# Patient Record
Sex: FEMALE | Race: WHITE | NOT HISPANIC OR LATINO | URBAN - METROPOLITAN AREA
[De-identification: names, ages, dates, MRNs, and addresses within clinical notes are randomized per-mention and may not be internally consistent; named-entity substitution may affect disease eponyms.]

---

## 2017-01-27 ENCOUNTER — OFFICE VISIT - RIVER FALLS (OUTPATIENT)
Dept: FAMILY MEDICINE | Facility: CLINIC | Age: 22
End: 2017-01-27

## 2017-02-07 ENCOUNTER — OFFICE VISIT - RIVER FALLS (OUTPATIENT)
Dept: FAMILY MEDICINE | Facility: CLINIC | Age: 22
End: 2017-02-07

## 2017-02-07 ASSESSMENT — MIFFLIN-ST. JEOR: SCORE: 1988.83

## 2017-03-27 ENCOUNTER — OFFICE VISIT - RIVER FALLS (OUTPATIENT)
Dept: FAMILY MEDICINE | Facility: CLINIC | Age: 22
End: 2017-03-27

## 2017-04-27 ENCOUNTER — OFFICE VISIT - RIVER FALLS (OUTPATIENT)
Dept: FAMILY MEDICINE | Facility: CLINIC | Age: 22
End: 2017-04-27

## 2017-07-13 ENCOUNTER — OFFICE VISIT - RIVER FALLS (OUTPATIENT)
Dept: FAMILY MEDICINE | Facility: CLINIC | Age: 22
End: 2017-07-13

## 2017-08-09 ENCOUNTER — OFFICE VISIT - RIVER FALLS (OUTPATIENT)
Dept: FAMILY MEDICINE | Facility: CLINIC | Age: 22
End: 2017-08-09

## 2017-08-09 ASSESSMENT — MIFFLIN-ST. JEOR: SCORE: 2025.12

## 2018-02-15 ENCOUNTER — OFFICE VISIT - RIVER FALLS (OUTPATIENT)
Dept: FAMILY MEDICINE | Facility: CLINIC | Age: 23
End: 2018-02-15

## 2018-02-15 ASSESSMENT — MIFFLIN-ST. JEOR: SCORE: 2058.69

## 2018-02-23 ENCOUNTER — OFFICE VISIT - RIVER FALLS (OUTPATIENT)
Dept: FAMILY MEDICINE | Facility: CLINIC | Age: 23
End: 2018-02-23

## 2018-03-01 ENCOUNTER — OFFICE VISIT - RIVER FALLS (OUTPATIENT)
Dept: FAMILY MEDICINE | Facility: CLINIC | Age: 23
End: 2018-03-01

## 2018-05-14 ENCOUNTER — OFFICE VISIT - RIVER FALLS (OUTPATIENT)
Dept: FAMILY MEDICINE | Facility: CLINIC | Age: 23
End: 2018-05-14

## 2018-05-14 ENCOUNTER — COMMUNICATION - RIVER FALLS (OUTPATIENT)
Dept: FAMILY MEDICINE | Facility: CLINIC | Age: 23
End: 2018-05-14

## 2018-05-16 ENCOUNTER — OFFICE VISIT - RIVER FALLS (OUTPATIENT)
Dept: FAMILY MEDICINE | Facility: CLINIC | Age: 23
End: 2018-05-16

## 2018-05-16 ASSESSMENT — MIFFLIN-ST. JEOR: SCORE: 2075.47

## 2018-06-14 ENCOUNTER — OFFICE VISIT - RIVER FALLS (OUTPATIENT)
Dept: FAMILY MEDICINE | Facility: CLINIC | Age: 23
End: 2018-06-14

## 2018-07-13 ENCOUNTER — OFFICE VISIT - RIVER FALLS (OUTPATIENT)
Dept: FAMILY MEDICINE | Facility: CLINIC | Age: 23
End: 2018-07-13

## 2018-07-13 ASSESSMENT — MIFFLIN-ST. JEOR: SCORE: 2105.86

## 2018-11-09 ENCOUNTER — OFFICE VISIT - RIVER FALLS (OUTPATIENT)
Dept: FAMILY MEDICINE | Facility: CLINIC | Age: 23
End: 2018-11-09

## 2018-11-09 ENCOUNTER — COMMUNICATION - RIVER FALLS (OUTPATIENT)
Dept: FAMILY MEDICINE | Facility: CLINIC | Age: 23
End: 2018-11-09

## 2019-01-10 ENCOUNTER — OFFICE VISIT - RIVER FALLS (OUTPATIENT)
Dept: FAMILY MEDICINE | Facility: CLINIC | Age: 24
End: 2019-01-10

## 2019-02-22 ENCOUNTER — OFFICE VISIT - RIVER FALLS (OUTPATIENT)
Dept: FAMILY MEDICINE | Facility: CLINIC | Age: 24
End: 2019-02-22

## 2019-02-22 LAB — HCG UR QL: NEGATIVE

## 2019-02-23 LAB
CHLAMYDIA TRACHOMATIS RNA, TMA - QUEST: NOT DETECTED
NEISSERIA GONORRHOEAE RNA TMA: NOT DETECTED

## 2019-04-04 ENCOUNTER — OFFICE VISIT - RIVER FALLS (OUTPATIENT)
Dept: FAMILY MEDICINE | Facility: CLINIC | Age: 24
End: 2019-04-04

## 2019-04-04 ASSESSMENT — MIFFLIN-ST. JEOR: SCORE: 2097.7

## 2019-04-12 ENCOUNTER — OFFICE VISIT - RIVER FALLS (OUTPATIENT)
Dept: FAMILY MEDICINE | Facility: CLINIC | Age: 24
End: 2019-04-12

## 2019-04-12 ASSESSMENT — MIFFLIN-ST. JEOR: SCORE: 2120.38

## 2020-09-23 ENCOUNTER — OFFICE VISIT - RIVER FALLS (OUTPATIENT)
Dept: FAMILY MEDICINE | Facility: CLINIC | Age: 25
End: 2020-09-23

## 2020-09-23 ENCOUNTER — AMBULATORY - RIVER FALLS (OUTPATIENT)
Dept: FAMILY MEDICINE | Facility: CLINIC | Age: 25
End: 2020-09-23

## 2020-10-09 ENCOUNTER — OFFICE VISIT - RIVER FALLS (OUTPATIENT)
Dept: FAMILY MEDICINE | Facility: CLINIC | Age: 25
End: 2020-10-09

## 2020-10-09 ASSESSMENT — MIFFLIN-ST. JEOR: SCORE: 2156.66

## 2020-10-19 ENCOUNTER — OFFICE VISIT - RIVER FALLS (OUTPATIENT)
Dept: FAMILY MEDICINE | Facility: CLINIC | Age: 25
End: 2020-10-19

## 2020-10-19 ASSESSMENT — MIFFLIN-ST. JEOR: SCORE: 2170.72

## 2020-12-17 ENCOUNTER — OFFICE VISIT - RIVER FALLS (OUTPATIENT)
Dept: FAMILY MEDICINE | Facility: CLINIC | Age: 25
End: 2020-12-17

## 2020-12-17 ASSESSMENT — MIFFLIN-ST. JEOR: SCORE: 2173.9

## 2021-02-01 ENCOUNTER — OFFICE VISIT - RIVER FALLS (OUTPATIENT)
Dept: FAMILY MEDICINE | Facility: CLINIC | Age: 26
End: 2021-02-01

## 2021-02-01 ENCOUNTER — AMBULATORY - RIVER FALLS (OUTPATIENT)
Dept: FAMILY MEDICINE | Facility: CLINIC | Age: 26
End: 2021-02-01

## 2021-02-01 ENCOUNTER — COMMUNICATION - RIVER FALLS (OUTPATIENT)
Dept: FAMILY MEDICINE | Facility: CLINIC | Age: 26
End: 2021-02-01

## 2021-02-01 LAB — DEPRECATED S PYO AG THROAT QL EIA: NOT DETECTED

## 2021-02-03 LAB — SARS-COV-2 RNA RESP QL NAA+PROBE: NEGATIVE

## 2021-03-02 ENCOUNTER — OFFICE VISIT - RIVER FALLS (OUTPATIENT)
Dept: FAMILY MEDICINE | Facility: CLINIC | Age: 26
End: 2021-03-02

## 2021-03-02 ENCOUNTER — AMBULATORY - RIVER FALLS (OUTPATIENT)
Dept: FAMILY MEDICINE | Facility: CLINIC | Age: 26
End: 2021-03-02

## 2021-03-02 LAB
DEPRECATED S PYO AG THROAT QL EIA: NOT DETECTED
FLUAV AG SPEC QL IA: NEGATIVE
FLUBV AG SPEC QL IA: NEGATIVE

## 2021-03-03 ENCOUNTER — COMMUNICATION - RIVER FALLS (OUTPATIENT)
Dept: FAMILY MEDICINE | Facility: CLINIC | Age: 26
End: 2021-03-03

## 2021-03-04 ENCOUNTER — COMMUNICATION - RIVER FALLS (OUTPATIENT)
Dept: FAMILY MEDICINE | Facility: CLINIC | Age: 26
End: 2021-03-04

## 2021-03-04 ENCOUNTER — OFFICE VISIT - RIVER FALLS (OUTPATIENT)
Dept: FAMILY MEDICINE | Facility: CLINIC | Age: 26
End: 2021-03-04

## 2021-03-04 LAB — SARS-COV-2 RNA RESP QL NAA+PROBE: NEGATIVE

## 2021-03-04 ASSESSMENT — MIFFLIN-ST. JEOR: SCORE: 2188.42

## 2021-08-03 ENCOUNTER — COMMUNICATION - RIVER FALLS (OUTPATIENT)
Dept: FAMILY MEDICINE | Facility: CLINIC | Age: 26
End: 2021-08-03

## 2021-09-15 ENCOUNTER — OFFICE VISIT - RIVER FALLS (OUTPATIENT)
Dept: FAMILY MEDICINE | Facility: CLINIC | Age: 26
End: 2021-09-15

## 2021-09-21 ENCOUNTER — COMMUNICATION - RIVER FALLS (OUTPATIENT)
Dept: FAMILY MEDICINE | Facility: CLINIC | Age: 26
End: 2021-09-21

## 2021-09-22 ENCOUNTER — OFFICE VISIT - RIVER FALLS (OUTPATIENT)
Dept: FAMILY MEDICINE | Facility: CLINIC | Age: 26
End: 2021-09-22

## 2021-09-22 ENCOUNTER — COMMUNICATION - RIVER FALLS (OUTPATIENT)
Dept: FAMILY MEDICINE | Facility: CLINIC | Age: 26
End: 2021-09-22

## 2021-09-22 ASSESSMENT — MIFFLIN-ST. JEOR: SCORE: 2192.5

## 2021-09-25 LAB
CHLAMYDIA TRACHOMATIS RNA, TMA - QUEST: NOT DETECTED
HSV CULTURE - QUEST: NORMAL
MICROBIOLOGY SPEC SOURCE (HISTORICAL): NORMAL
NEISSERIA GONORRHOEAE RNA TMA: NOT DETECTED

## 2021-09-28 ENCOUNTER — COMMUNICATION - RIVER FALLS (OUTPATIENT)
Dept: FAMILY MEDICINE | Facility: CLINIC | Age: 26
End: 2021-09-28

## 2021-11-03 ENCOUNTER — COMMUNICATION - RIVER FALLS (OUTPATIENT)
Dept: FAMILY MEDICINE | Facility: CLINIC | Age: 26
End: 2021-11-03

## 2021-11-24 ENCOUNTER — OFFICE VISIT - RIVER FALLS (OUTPATIENT)
Dept: FAMILY MEDICINE | Facility: CLINIC | Age: 26
End: 2021-11-24

## 2021-11-24 ASSESSMENT — MIFFLIN-ST. JEOR: SCORE: 2191.59

## 2021-12-31 ENCOUNTER — OFFICE VISIT - RIVER FALLS (OUTPATIENT)
Dept: FAMILY MEDICINE | Facility: CLINIC | Age: 26
End: 2021-12-31

## 2021-12-31 ENCOUNTER — AMBULATORY - RIVER FALLS (OUTPATIENT)
Dept: FAMILY MEDICINE | Facility: CLINIC | Age: 26
End: 2021-12-31

## 2021-12-31 ENCOUNTER — LAB REQUISITION (OUTPATIENT)
Dept: LAB | Facility: CLINIC | Age: 26
End: 2021-12-31
Payer: COMMERCIAL

## 2021-12-31 DIAGNOSIS — U07.1 COVID-19: ICD-10-CM

## 2021-12-31 PROCEDURE — U0003 INFECTIOUS AGENT DETECTION BY NUCLEIC ACID (DNA OR RNA); SEVERE ACUTE RESPIRATORY SYNDROME CORONAVIRUS 2 (SARS-COV-2) (CORONAVIRUS DISEASE [COVID-19]), AMPLIFIED PROBE TECHNIQUE, MAKING USE OF HIGH THROUGHPUT TECHNOLOGIES AS DESCRIBED BY CMS-2020-01-R: HCPCS | Mod: ORL | Performed by: FAMILY MEDICINE

## 2022-01-01 LAB — SARS-COV-2 RNA RESP QL NAA+PROBE: NEGATIVE

## 2022-02-06 ENCOUNTER — HEALTH MAINTENANCE LETTER (OUTPATIENT)
Age: 27
End: 2022-02-06

## 2022-02-11 VITALS
WEIGHT: 265 LBS | HEART RATE: 76 BPM | HEART RATE: 72 BPM | DIASTOLIC BLOOD PRESSURE: 92 MMHG | SYSTOLIC BLOOD PRESSURE: 130 MMHG | SYSTOLIC BLOOD PRESSURE: 134 MMHG | DIASTOLIC BLOOD PRESSURE: 94 MMHG | WEIGHT: 266 LBS

## 2022-02-11 VITALS
BODY MASS INDEX: 37.36 KG/M2 | BODY MASS INDEX: 41.02 KG/M2 | HEIGHT: 71 IN | DIASTOLIC BLOOD PRESSURE: 86 MMHG | WEIGHT: 293 LBS | OXYGEN SATURATION: 97 % | SYSTOLIC BLOOD PRESSURE: 126 MMHG | HEART RATE: 98 BPM | TEMPERATURE: 98.4 F | HEIGHT: 71 IN

## 2022-02-12 VITALS
DIASTOLIC BLOOD PRESSURE: 85 MMHG | TEMPERATURE: 98.8 F | HEART RATE: 85 BPM | SYSTOLIC BLOOD PRESSURE: 128 MMHG | BODY MASS INDEX: 39.16 KG/M2 | WEIGHT: 278.8 LBS

## 2022-02-12 VITALS
TEMPERATURE: 98.3 F | OXYGEN SATURATION: 96 % | SYSTOLIC BLOOD PRESSURE: 126 MMHG | HEIGHT: 71 IN | BODY MASS INDEX: 41.02 KG/M2 | DIASTOLIC BLOOD PRESSURE: 90 MMHG | HEART RATE: 91 BPM | WEIGHT: 293 LBS

## 2022-02-12 VITALS
WEIGHT: 285.8 LBS | WEIGHT: 284 LBS | TEMPERATURE: 97.6 F | SYSTOLIC BLOOD PRESSURE: 138 MMHG | DIASTOLIC BLOOD PRESSURE: 88 MMHG | BODY MASS INDEX: 40.14 KG/M2 | BODY MASS INDEX: 39.89 KG/M2 | TEMPERATURE: 98.5 F | DIASTOLIC BLOOD PRESSURE: 96 MMHG | OXYGEN SATURATION: 97 % | HEART RATE: 80 BPM | HEART RATE: 104 BPM | SYSTOLIC BLOOD PRESSURE: 148 MMHG

## 2022-02-12 VITALS
BODY MASS INDEX: 39.05 KG/M2 | BODY MASS INDEX: 39.31 KG/M2 | DIASTOLIC BLOOD PRESSURE: 90 MMHG | HEART RATE: 90 BPM | SYSTOLIC BLOOD PRESSURE: 124 MMHG | OXYGEN SATURATION: 98 % | HEART RATE: 80 BPM | TEMPERATURE: 98.5 F | WEIGHT: 278 LBS | WEIGHT: 280.8 LBS | DIASTOLIC BLOOD PRESSURE: 100 MMHG | HEIGHT: 71 IN | SYSTOLIC BLOOD PRESSURE: 132 MMHG | TEMPERATURE: 98.3 F

## 2022-02-12 VITALS
SYSTOLIC BLOOD PRESSURE: 122 MMHG | WEIGHT: 279 LBS | HEART RATE: 72 BPM | DIASTOLIC BLOOD PRESSURE: 80 MMHG | BODY MASS INDEX: 39.06 KG/M2 | RESPIRATION RATE: 16 BRPM | HEART RATE: 98 BPM | TEMPERATURE: 100 F | TEMPERATURE: 98.7 F | OXYGEN SATURATION: 99 % | WEIGHT: 284 LBS | DIASTOLIC BLOOD PRESSURE: 88 MMHG | HEIGHT: 71 IN | BODY MASS INDEX: 39.76 KG/M2 | RESPIRATION RATE: 16 BRPM | SYSTOLIC BLOOD PRESSURE: 110 MMHG | HEIGHT: 71 IN

## 2022-02-12 VITALS
BODY MASS INDEX: 38.91 KG/M2 | SYSTOLIC BLOOD PRESSURE: 128 MMHG | HEART RATE: 84 BPM | BODY MASS INDEX: 38.37 KG/M2 | SYSTOLIC BLOOD PRESSURE: 129 MMHG | TEMPERATURE: 97.8 F | TEMPERATURE: 98.5 F | DIASTOLIC BLOOD PRESSURE: 80 MMHG | HEART RATE: 93 BPM | DIASTOLIC BLOOD PRESSURE: 86 MMHG | HEIGHT: 71 IN | WEIGHT: 277 LBS | WEIGHT: 274.1 LBS

## 2022-02-12 VITALS
HEART RATE: 96 BPM | WEIGHT: 292 LBS | TEMPERATURE: 97.5 F | BODY MASS INDEX: 40.88 KG/M2 | HEART RATE: 80 BPM | WEIGHT: 293 LBS | TEMPERATURE: 97.8 F | DIASTOLIC BLOOD PRESSURE: 78 MMHG | HEIGHT: 71 IN | HEIGHT: 71 IN | DIASTOLIC BLOOD PRESSURE: 90 MMHG | SYSTOLIC BLOOD PRESSURE: 126 MMHG | OXYGEN SATURATION: 98 % | SYSTOLIC BLOOD PRESSURE: 110 MMHG | BODY MASS INDEX: 41.02 KG/M2

## 2022-02-12 VITALS
DIASTOLIC BLOOD PRESSURE: 84 MMHG | SYSTOLIC BLOOD PRESSURE: 116 MMHG | HEART RATE: 84 BPM | DIASTOLIC BLOOD PRESSURE: 76 MMHG | BODY MASS INDEX: 42 KG/M2 | TEMPERATURE: 98.1 F | OXYGEN SATURATION: 97 % | SYSTOLIC BLOOD PRESSURE: 124 MMHG | WEIGHT: 293 LBS | HEIGHT: 71 IN | HEIGHT: 71 IN | BODY MASS INDEX: 41.02 KG/M2 | TEMPERATURE: 98.3 F | HEART RATE: 104 BPM

## 2022-02-12 VITALS
SYSTOLIC BLOOD PRESSURE: 124 MMHG | WEIGHT: 270.4 LBS | DIASTOLIC BLOOD PRESSURE: 88 MMHG | BODY MASS INDEX: 37.85 KG/M2 | SYSTOLIC BLOOD PRESSURE: 130 MMHG | DIASTOLIC BLOOD PRESSURE: 76 MMHG | HEART RATE: 84 BPM | TEMPERATURE: 97.5 F | HEART RATE: 84 BPM | TEMPERATURE: 97.9 F | TEMPERATURE: 97.7 F | SYSTOLIC BLOOD PRESSURE: 124 MMHG | HEIGHT: 71 IN | DIASTOLIC BLOOD PRESSURE: 84 MMHG | OXYGEN SATURATION: 97 % | HEART RATE: 84 BPM

## 2022-02-12 VITALS
HEIGHT: 71 IN | TEMPERATURE: 97.5 F | HEIGHT: 71 IN | WEIGHT: 293 LBS | BODY MASS INDEX: 41.55 KG/M2 | SYSTOLIC BLOOD PRESSURE: 120 MMHG | BODY MASS INDEX: 41.02 KG/M2 | OXYGEN SATURATION: 98 % | HEART RATE: 95 BPM | DIASTOLIC BLOOD PRESSURE: 82 MMHG

## 2022-02-12 VITALS
HEIGHT: 71 IN | WEIGHT: 255 LBS | HEART RATE: 84 BPM | HEART RATE: 90 BPM | TEMPERATURE: 97.1 F | DIASTOLIC BLOOD PRESSURE: 92 MMHG | DIASTOLIC BLOOD PRESSURE: 85 MMHG | BODY MASS INDEX: 36.1 KG/M2 | SYSTOLIC BLOOD PRESSURE: 120 MMHG | BODY MASS INDEX: 35.7 KG/M2 | WEIGHT: 257 LBS | SYSTOLIC BLOOD PRESSURE: 126 MMHG

## 2022-02-12 VITALS
DIASTOLIC BLOOD PRESSURE: 86 MMHG | WEIGHT: 263 LBS | TEMPERATURE: 98.3 F | SYSTOLIC BLOOD PRESSURE: 118 MMHG | BODY MASS INDEX: 36.82 KG/M2 | HEART RATE: 80 BPM | HEIGHT: 71 IN

## 2022-02-16 NOTE — NURSING NOTE
Comprehensive Intake Entered On:  4/12/2019 3:09 PM CDT    Performed On:  4/12/2019 3:04 PM CDT by Kit Ordaz CMA               Summary   Chief Complaint :   Pt here for sinus pressure x 4-5 days.   Menstrual Status :   Prophylaxis   Weight Measured :   284 lb(Converted to: 284 lb 0 oz, 128.82 kg)    Height Measured :   70.75 in(Converted to: 5 ft 11 in, 179.70 cm)    Body Mass Index :   39.89 kg/m2 (HI)    Body Surface Area :   2.53 m2   Systolic Blood Pressure :   122 mmHg   Diastolic Blood Pressure :   88 mmHg (HI)    Mean Arterial Pressure :   99 mmHg   Peripheral Pulse Rate :   72 bpm   BP Site :   Right arm   Pulse Site :   Radial artery   Temperature Tympanic :   98.7 DegF(Converted to: 37.1 DegC)    Respiratory Rate :   16 br/min   Oxygen Saturation :   99 %   Race :      Languages :   English   Ethnicity :   Not  or    Kit Ordaz CMA - 4/12/2019 3:04 PM CDT   Health Status   Allergies Verified? :   Yes   Medication History Verified? :   Yes   Immunizations Current :   No   Medical History Verified? :   Yes   Pre-Visit Planning Status :   Not completed   Tobacco Use? :   Current every day smoker   Tobacco Cessation Review :   Not ready to quit   Kit Ordaz CMA - 4/12/2019 3:04 PM CDT   Meds / Allergies   (As Of: 4/12/2019 3:09:00 PM CDT)   Allergies (Active)   clindamycin  Estimated Onset Date:   Unspecified ; Reactions:   diarrhea ; Created By:   Sophia Nuñez CMA; Reaction Status:   Active ; Category:   Drug ; Substance:   clindamycin ; Type:   Allergy ; Updated By:   Sophia Nuñez CMA; Reviewed Date:   4/12/2019 3:07 PM CDT        Medication List   (As Of: 4/12/2019 3:09:00 PM CDT)   Prescription/Discharge Order    FLUoxetine  :   FLUoxetine ; Status:   Prescribed ; Ordered As Mnemonic:   FLUoxetine 10 mg oral capsule ; Simple Display Line:   3 cap(s), Oral, daily, 90 cap(s), 1 Refill(s) ; Ordering Provider:   Shira Hester; Catalog Code:   FLUoxetine ; Order  Dt/Tm:   2/6/2019 2:01:24 PM          busPIRone  :   busPIRone ; Status:   Prescribed ; Ordered As Mnemonic:   busPIRone 10 mg oral tablet ; Simple Display Line:   10 mg, 1 tab(s), PO, TID, 270 tab(s), 1 Refill(s) ; Ordering Provider:   Shira Hester; Catalog Code:   busPIRone ; Order Dt/Tm:   1/10/2019 8:52:59 AM          albuterol  :   albuterol ; Status:   Prescribed ; Ordered As Mnemonic:   albuterol CFC free 90 mcg/inh inhalation aerosol ; Simple Display Line:   2 puff(s), inh, qid, dispense with Valved Chamber please  use with spacer chamber, PRN: as needed for wheezing, 18 gm, 0 Refill(s) ; Ordering Provider:   Miguelina Bush; Catalog Code:   albuterol ; Order Dt/Tm:   3/22/2016 11:26:52 AM            Home Meds    fexofenadine  :   fexofenadine ; Status:   Documented ; Ordered As Mnemonic:   Allegra ; Simple Display Line:   po, 0 Refill(s) ; Catalog Code:   fexofenadine ; Order Dt/Tm:   5/25/2016 10:39:53 AM          levonorgestrel  :   levonorgestrel ; Status:   Documented ; Ordered As Mnemonic:   Keshia 13.5 mg intrauteral device ; Simple Display Line:   13.5 mg, 1 EA, intrauteral, once, Lot [id number], inserted 2/9/16, 0 Refill(s) ; Catalog Code:   levonorgestrel ; Order Dt/Tm:   2/9/2016 3:45:21 PM          fluticasone nasal  :   fluticasone nasal ; Status:   Documented ; Ordered As Mnemonic:   Flonase 50 mcg/inh nasal spray ; Simple Display Line:   1 spray(s), nasal, prn ; Catalog Code:   fluticasone nasal ; Order Dt/Tm:   10/20/2015 1:18:23 PM            Social History   Social History   (As Of: 4/12/2019 3:09:00 PM CDT)   Alcohol:        Current, 1 TIME, 4 drinks/episode average.  5 drinks/episode maximum.   (Last Updated: 1/10/2019 3:16:46 PM CST by Leatha Means)          Tobacco:        Electronic Cigarettes   (Last Updated: 2/15/2018 4:00:43 PM CST by Helen Frankel CMA)          Employment and Education:        Student   Comments:  11/21/2016 11:14 AM - Kit Ordaz CMA: Senior at  [employer]   (Last Updated: 11/21/2016 11:14:11 AM CST by Kit Ordaz CMA)

## 2022-02-16 NOTE — PROGRESS NOTES
Called pt about her negative rapid strep results and understands to wait for the culture and COVID results

## 2022-02-16 NOTE — NURSING NOTE
Comprehensive Intake Entered On:  3/2/2021 11:47 AM CST    Performed On:  3/2/2021 11:41 AM CST by Jennifer Warren LPN               Summary   Chief Complaint :   flu/cold like symptoms, has received 2 vaccines, c/o fever, sore throat, chills, HA, bodyaches, runny nose, fatigue - verbal consent for telephone visit   Menstrual Status :   Prophylaxis   Height Measured :   70.75 in(Converted to: 5 ft 11 in, 179.70 cm)    Race :      Languages :   English   Ethnicity :   Not  or    Jennifer Warren LPN - 3/2/2021 11:41 AM CST   Health Status   Allergies Verified? :   Yes   Medication History Verified? :   Yes   Immunizations Current :   No   Medical History Verified? :   No   Pre-Visit Planning Status :   Completed   Tobacco Use? :   Former smoker   Jennifer Warren LPN - 3/2/2021 11:41 AM CST   Meds / Allergies   (As Of: 3/2/2021 11:47:35 AM CST)   Allergies (Active)   clindamycin  Estimated Onset Date:   Unspecified ; Reactions:   diarrhea ; Created By:   Sophia Nuñez CMA; Reaction Status:   Active ; Category:   Drug ; Substance:   clindamycin ; Type:   Allergy ; Updated By:   Sophia Nuñez CMA; Reviewed Date:   2/1/2021 8:57 AM CST        Medication List   (As Of: 3/2/2021 11:47:35 AM CST)   Prescription/Discharge Order    aripiprazole  :   aripiprazole ; Status:   Prescribed ; Ordered As Mnemonic:   ARIPiprazole 5 mg oral tablet ; Simple Display Line:   5 mg, 1 tab(s), Oral, daily, 90 EA, 1 Refill(s) ; Ordering Provider:   Miguelina Bush; Catalog Code:   aripiprazole ; Order Dt/Tm:   10/9/2020 8:54:14 AM CDT          FLUoxetine  :   FLUoxetine ; Status:   Prescribed ; Ordered As Mnemonic:   FLUoxetine 10 mg oral capsule ; Simple Display Line:   3 cap(s), Oral, daily, 270 cap(s), 1 Refill(s) ; Ordering Provider:   Miguelina Bush; Catalog Code:   FLUoxetine ; Order Dt/Tm:   10/9/2020 8:54:41 AM CDT          nicotine  :   nicotine ; Status:   Prescribed ; Ordered As Mnemonic:    nicotine 7 mg/24 hr transdermal film, extended release ; Simple Display Line:   1 patch(es), Topical, daily, for 14 day(s), after 14 days of 14 mg nicotine, 14 patch(es), 0 Refill(s) ; Ordering Provider:   Miguelina Bush; Catalog Code:   nicotine ; Order Dt/Tm:   10/9/2020 8:53:20 AM CDT          nicotine  :   nicotine ; Status:   Prescribed ; Ordered As Mnemonic:   nicotine 14 mg/24 hr transdermal film, extended release ; Simple Display Line:   1 patch(es), Topical, daily, for 14 day(s), start and use prior to 7mg dose, 14 patch(es), 0 Refill(s) ; Ordering Provider:   Miguelina Bush; Catalog Code:   nicotine ; Order Dt/Tm:   10/9/2020 8:53:14 AM CDT            Home Meds    levonorgestrel  :   levonorgestrel ; Status:   Documented ; Ordered As Mnemonic:   Kyleena 19.5 mg intrauterine device ; Simple Display Line:   19.5 mg, 1 EA, Intrauteral, once, inserted 2/22/19, 0 Refill(s) ; Catalog Code:   levonorgestrel ; Order Dt/Tm:   10/9/2020 8:35:49 AM CDT          fluticasone nasal  :   fluticasone nasal ; Status:   Documented ; Ordered As Mnemonic:   Flonase 50 mcg/inh nasal spray ; Simple Display Line:   1 spray(s), nasal, prn ; Catalog Code:   fluticasone nasal ; Order Dt/Tm:   10/20/2015 1:18:23 PM CDT          fexofenadine  :   fexofenadine ; Status:   Documented ; Ordered As Mnemonic:   Allegra ; Simple Display Line:   po, 0 Refill(s) ; Catalog Code:   fexofenadine ; Order Dt/Tm:   5/25/2016 10:39:53 AM CDT            ID Risk Screen   Recent Travel History :   No recent travel   Family Member Travel History :   No recent travel   Other Exposure to Infectious Disease :   Unknown   COVID-19 Testing Status :   No positive COVID-19 test   Jennifer Warren LPN - 3/2/2021 11:41 AM CST

## 2022-02-16 NOTE — NURSING NOTE
Comprehensive Intake Entered On:  2/1/2021 8:59 AM CST    Performed On:  2/1/2021 8:53 AM CST by Kit Ordaz CMA               Summary   Chief Complaint :   Verbal consent given for a video visit. Pt is c/o fever,sore throat,chills,headachem,body aches and fatigue that started yesterday.   Menstrual Status :   Prophylaxis   Height Measured :   70.75 in(Converted to: 5 ft 11 in, 179.70 cm)    Race :      Languages :   English   Ethnicity :   Not  or    Kit Ordaz CMA - 2/1/2021 8:53 AM CST   Health Status   Allergies Verified? :   Yes   Medication History Verified? :   No   Immunizations Current :   No   Medical History Verified? :   Yes   Pre-Visit Planning Status :   Not completed   Tobacco Use? :   Former smoker   Kit Ordaz CMA - 2/1/2021 8:53 AM CST   Meds / Allergies   (As Of: 2/1/2021 8:59:05 AM CST)   Allergies (Active)   clindamycin  Estimated Onset Date:   Unspecified ; Reactions:   diarrhea ; Created By:   Sophia Nuñez CMA; Reaction Status:   Active ; Category:   Drug ; Substance:   clindamycin ; Type:   Allergy ; Updated By:   Sophia Nuñez CMA; Reviewed Date:   2/1/2021 8:57 AM CST        Medication List   (As Of: 2/1/2021 8:59:05 AM CST)   Prescription/Discharge Order    nicotine  :   nicotine ; Status:   Prescribed ; Ordered As Mnemonic:   nicotine 14 mg/24 hr transdermal film, extended release ; Simple Display Line:   1 patch(es), Topical, daily, for 14 day(s), start and use prior to 7mg dose, 14 patch(es), 0 Refill(s) ; Ordering Provider:   Miguelina Bush; Catalog Code:   nicotine ; Order Dt/Tm:   10/9/2020 8:53:14 AM CDT          nicotine  :   nicotine ; Status:   Prescribed ; Ordered As Mnemonic:   nicotine 7 mg/24 hr transdermal film, extended release ; Simple Display Line:   1 patch(es), Topical, daily, for 14 day(s), after 14 days of 14 mg nicotine, 14 patch(es), 0 Refill(s) ; Ordering Provider:   Miguelina Bush; Catalog Code:   nicotine ; Order  Dt/Tm:   10/9/2020 8:53:20 AM CDT          FLUoxetine  :   FLUoxetine ; Status:   Prescribed ; Ordered As Mnemonic:   FLUoxetine 10 mg oral capsule ; Simple Display Line:   3 cap(s), Oral, daily, 270 cap(s), 1 Refill(s) ; Ordering Provider:   Miguelina uBsh; Catalog Code:   FLUoxetine ; Order Dt/Tm:   10/9/2020 8:54:41 AM CDT          aripiprazole  :   aripiprazole ; Status:   Prescribed ; Ordered As Mnemonic:   ARIPiprazole 5 mg oral tablet ; Simple Display Line:   5 mg, 1 tab(s), Oral, daily, 90 EA, 1 Refill(s) ; Ordering Provider:   Miguelina Bush; Catalog Code:   aripiprazole ; Order Dt/Tm:   10/9/2020 8:54:14 AM CDT            Home Meds    levonorgestrel  :   levonorgestrel ; Status:   Documented ; Ordered As Mnemonic:   Kyleena 19.5 mg intrauterine device ; Simple Display Line:   19.5 mg, 1 EA, Intrauteral, once, inserted 2/22/19, 0 Refill(s) ; Catalog Code:   levonorgestrel ; Order Dt/Tm:   10/9/2020 8:35:49 AM CDT          fexofenadine  :   fexofenadine ; Status:   Documented ; Ordered As Mnemonic:   Allegra ; Simple Display Line:   po, 0 Refill(s) ; Catalog Code:   fexofenadine ; Order Dt/Tm:   5/25/2016 10:39:53 AM CDT          fluticasone nasal  :   fluticasone nasal ; Status:   Documented ; Ordered As Mnemonic:   Flonase 50 mcg/inh nasal spray ; Simple Display Line:   1 spray(s), nasal, prn ; Catalog Code:   fluticasone nasal ; Order Dt/Tm:   10/20/2015 1:18:23 PM CDT            ID Risk Screen   Recent Travel History :   No recent travel   Family Member Travel History :   No recent travel   Other Exposure to Infectious Disease :   Unknown   COVID-19 Testing Status :   No positive COVID-19 test   Kit Ordaz CMA - 2/1/2021 8:53 AM CST   Social History   Social History   (As Of: 2/1/2021 8:59:05 AM CST)   Alcohol:        Current, 1 TIME, 4 drinks/episode average.  5 drinks/episode maximum.   (Last Updated: 1/10/2019 3:16:46 PM CST by Leatha Means)          Tobacco:        Former smoker,  quit more than 30 days ago   (Last Updated: 12/17/2020 5:39:32 PM CST by Jennifer Warren LPN)          Electronic Cigarette/Vaping:        Electronic Cigarette Use: vapes.  1/2 cartridge/day Use Per Day.   (Last Updated: 12/17/2020 5:39:22 PM CST by Jennifer Warren LPN)          Employment/School:        Student   Comments:  11/21/2016 11:14 AM - Kit Ordaz CMA: Senior at [employer]   (Last Updated: 11/21/2016 11:14:11 AM CST by Kit Ordaz CMA)

## 2022-02-16 NOTE — PROGRESS NOTES
Patient:   DYAN CONTRERAS            MRN: [id number]            FIN: [id number]               Age:   25 years     Sex:  Female     :  1995   Associated Diagnoses:   Close exposure to 2019 novel coronavirus; Fever; Sinus congestion; Sore throat   Author:   Miguelina Bush      Visit Information      Date of Service: 2021 11:36 am  Performing Location: [facility]  Encounter#: [id number]      Primary Care Provider (PCP):  Miguelina Bush    NPI# [id number]      Referring Provider:  Miguelina Bush NPI# [id number]   Visit type:  telephone.    Participants in room during visit:  _pt   Location of patient:  _home  Location of provider:  _ clinic  Video Start Time:  1145  Video End Time:   _1158    Today's visit was conducted via TELEPHONE conference due to the COVID-19 pandemic.  The patient's consent to proceed with a TELEPHONE visit has been obtained and documented.      Chief Complaint   3/2/2021 11:41 AM CST    flu/cold like symptoms, has received 2 vaccines, c/o fever, sore throat, chills, HA, bodyaches, runny nose, fatigue - verbal consent for telephone visit        History of Present Illness   Patient is a _25 year old female_ who is being evaluated via a billable video visit.  Dyan works for [employer]  she was seen curbside here for URI symptoms early Feb, all testing negative  She received her second COVID vaccine on 2/10 and felt achey with fever and fatigue for nearly 2 weeks and missed a lot of work  She got better and now for about 24 hours she again has sinus congestion, sore throat and body aches  Her mom thought she might be re tested for COVID.  She is not aware of active COVID infection or influenza at the facility where she works  having no SOB, some cough, sinus congestion, body aches and fever up to 101.5 with fever reducer         Health Status   Allergies:    Allergic Reactions (Selected)  Severity Not Documented  Clindamycin  (Diarrhea)   Medications:  (Selected)   Prescriptions  Prescribed  ARIPiprazole 5 mg oral tablet: = 1 tab(s) ( 5 mg ), Oral, daily, # 90 EA, 1 Refill(s), Type: Maintenance, Pharmacy: [facility] #[id number], 1 tab(s) Oral daily, 70.75, in, 10/09/20 8:27:00 CDT, Height Measured, 292, lb, 10/09/20 8:27:00 CDT, Weight Measured  FLUoxetine 10 mg oral capsule: = 3 cap(s), Oral, daily, # 270 cap(s), 1 Refill(s), ISIAH, Type: Maintenance, Pharmacy: [facility] #[id number], 3 cap(s) Oral daily, 70.75, in, 10/09/20 8:27:00 CDT, Height Measured, 292, lb, 10/09/20 8:27:00 CDT, Weight Measured  nicotine 14 mg/24 hr transdermal film, extended release: 1 patch(es), Topical, daily, Instructions: start and use prior to 7mg dose, # 14 patch(es), 0 Refill(s), Type: Maintenance, Pharmacy: [facility] #[id number], 1 patch(es) Topical daily,x14 day(s),Instr:start and use prior to 7mg dose, 70.75, in, 1...  nicotine 7 mg/24 hr transdermal film, extended release: 1 patch(es), Topical, daily, Instructions: after 14 days of 14 mg nicotine, # 14 patch(es), 0 Refill(s), Type: Maintenance, Pharmacy: [facility] #[id number], 1 patch(es) Topical daily,x14 day(s),Instr:after 14 days of 14 mg nicotine, 70.75, in, 1...  Documented Medications  Documented  Allegra: po, 0 Refill(s), Type: Maintenance  Flonase 50 mcg/inh nasal spray: 1 spray(s), nasal, prn, Type: Maintenance  Kyleena 19.5 mg intrauterine device: = 1 EA ( 19.5 mg ), Intrauteral, once, Instructions: inserted 2/22/19, 0 Refill(s), Type: Maintenance,    Medications          *denotes recorded medication          FLUoxetine 10 mg oral capsule: 3 cap(s), Oral, daily, 270 cap(s), 1 Refill(s).          ARIPiprazole 5 mg oral tablet: 5 mg, 1 tab(s), Oral, daily, 90 EA, 1 Refill(s).          *Allegra: po, 0 Refill(s).          *Flonase 50 mcg/inh nasal spray: 1 spray(s), nasal, prn.          *Kyleena 19.5 mg intrauterine device: 19.5 mg, 1 EA, Intrauteral, once, inserted 2/22/19, 0  Refill(s).          nicotine 14 mg/24 hr transdermal film, extended release: 1 patch(es), Topical, daily, for 14 day(s), start and use prior to 7mg dose, 14 patch(es), 0 Refill(s).          nicotine 7 mg/24 hr transdermal film, extended release: 1 patch(es), Topical, daily, for 14 day(s), after 14 days of 14 mg nicotine, 14 patch(es), 0 Refill(s).       Problem list:    All Problems  Anxiety / SNOMED CT 1822623600 / Confirmed  Depressive disorder / SNOMED CT 8002450946 / Confirmed  Obesity / SNOMED CT 5352460023 / Probable  Tobacco user / SNOMED CT 127466945 / Probable      Histories   Past Medical History:    No active or resolved past medical history items have been selected or recorded.   Family History:    No family history items have been selected or recorded.   Procedure history:    Encounter for insertion of intrauterine contraceptive device (ICD-10-CM Z30.430) performed by Miguelina Bush on 2/22/2019 at 23 Years.  Comments:  2/24/2019 8:02 AM ATIF - Jennifer Warren LPN IUD insertion  Lot# [id number]  Exp. 01/2021    Due for removal 2/22/24  Extraction of wisdom tooth (SNOMED CT 227180288).   Social History:        Electronic Cigarette/Vaping Assessment            Electronic Cigarette Use: vapes.  1/2 cartridge/day Use Per Day.      Alcohol Assessment            Current, 1 TIME, 4 drinks/episode average.  5 drinks/episode maximum.      Tobacco Assessment            Former smoker, quit more than 30 days ago      Employment and Education Assessment            Student                     Comments:                      11/21/2016 - Kit Ordaz CMA at [place]        Impression and Plan   Diagnosis     Close exposure to 2019 novel coronavirus (HSA61-WU Z20.822).     Fever (UTX97-CN R50.9).     Sinus congestion (DUL77-CR R09.81).     Sore throat (QEO54-XY J02.9).     Patient Instructions:       Counseled: Patient, explained that liklihood of a COVID19 infection is low, but we  will test today  will add influenza test (also low occurence in community) and strep test.  If she is worsening before test results return, see ASAP care  If she simply isn't improving when the test results return, should seek care in clinic.    Orders     Orders (Selected)   Outpatient Orders  Ordered  SARS-CoV-2 RNA (COVID-19), Qualitative NAAT (Request): Close exposure to 2019 novel coronavirus  Ordered (Dispatched)  POC Influenza A and B Antigen* ([facility]): Specimen Type: Nasopharyngeal Swab, Collection Date: 03/02/21 12:04:00 CST  POC, GROUP A STREP* ([facility]): Specimen Type: Swab, Collection Date: 03/02/21 12:04:00 CST.

## 2022-02-16 NOTE — NURSING NOTE
Comprehensive Intake Entered On:  1/10/2019 8:38 AM CST    Performed On:  1/10/2019 8:32 AM CST by Moon Alves CMA               Summary   Chief Complaint :   f/u anxiety. Discuss dose.    Menstrual Status :   Prophylaxis   Weight Measured :   284 lb(Converted to: 284 lb 0 oz, 128.82 kg)    Systolic Blood Pressure :   148 mmHg (HI)    Diastolic Blood Pressure :   96 mmHg (HI)    Mean Arterial Pressure :   113 mmHg   Peripheral Pulse Rate :   80 bpm   BP Site :   Right arm   Pulse Site :   Radial artery   BP Method :   Manual   HR Method :   Manual   Temperature Tympanic :   97.6 DegF(Converted to: 36.4 DegC)  (LOW)    Race :      Languages :   English   Ethnicity :   Not  or    Moon Alves CMA - 1/10/2019 8:32 AM CST   Health Status   Allergies Verified? :   Yes   Medication History Verified? :   Yes   Immunizations Current :   No   Pre-Visit Planning Status :   Completed   Moon Alves CMA - 1/10/2019 8:32 AM CST   Consents   Consent for Immunization Exchange :   Consent Granted   Consent for Immunizations to Providers :   Consent Granted   Moon Alves CMA - 1/10/2019 8:32 AM CST   Meds / Allergies   (As Of: 1/10/2019 8:38:09 AM CST)   Allergies (Active)   clindamycin  Estimated Onset Date:   Unspecified ; Reactions:   diarrhea ; Created By:   Sophia Nuñez CMA; Reaction Status:   Active ; Category:   Drug ; Substance:   clindamycin ; Type:   Allergy ; Updated By:   Sophia Nuñez CMA; Reviewed Date:   7/13/2018 11:21 AM CDT        Medication List   (As Of: 1/10/2019 8:38:09 AM CST)   Prescription/Discharge Order    albuterol  :   albuterol ; Status:   Prescribed ; Ordered As Mnemonic:   albuterol CFC free 90 mcg/inh inhalation aerosol ; Simple Display Line:   2 puff(s), inh, qid, dispense with Valved Chamber please  use with spacer chamber, PRN: as needed for wheezing, 18 gm, 0 Refill(s) ; Ordering Provider:   Miguelina Bush; Catalog Code:   albuterol ; Order Dt/Tm:    3/22/2016 11:26:52 AM          busPIRone  :   busPIRone ; Status:   Prescribed ; Ordered As Mnemonic:   BuSpar 10 mg oral tablet ; Simple Display Line:   10 mg, 1 tab(s), PO, TID, 90 tab(s), 1 Refill(s) ; Ordering Provider:   Shira Hester; Catalog Code:   busPIRone ; Order Dt/Tm:   4/27/2017 10:29:07 AM          FLUoxetine  :   FLUoxetine ; Status:   Prescribed ; Ordered As Mnemonic:   FLUoxetine 10 mg oral capsule ; Simple Display Line:   30 mg, 3 cap(s), Oral, daily, 90 cap(s), 0 Refill(s) ; Ordering Provider:   Shira Hester; Catalog Code:   FLUoxetine ; Order Dt/Tm:   1/8/2019 2:27:43 PM            Home Meds    fexofenadine  :   fexofenadine ; Status:   Documented ; Ordered As Mnemonic:   Allegra ; Simple Display Line:   po, 0 Refill(s) ; Catalog Code:   fexofenadine ; Order Dt/Tm:   5/25/2016 10:39:53 AM          fluticasone nasal  :   fluticasone nasal ; Status:   Documented ; Ordered As Mnemonic:   Flonase 50 mcg/inh nasal spray ; Simple Display Line:   1 spray(s), nasal, prn ; Catalog Code:   fluticasone nasal ; Order Dt/Tm:   10/20/2015 1:18:23 PM          levonorgestrel  :   levonorgestrel ; Status:   Documented ; Ordered As Mnemonic:   Keshia 13.5 mg intrauteral device ; Simple Display Line:   13.5 mg, 1 EA, intrauteral, once, Lot [id number], inserted 2/9/16, 0 Refill(s) ; Catalog Code:   levonorgestrel ; Order Dt/Tm:   2/9/2016 3:45:21 PM

## 2022-02-16 NOTE — PROGRESS NOTES
Seen for COVID and Strep testing at [facility] per Davion Butler PA-C    O2 Sat = 96%  (Children under 12 do not require O2 sat)    Specimen sent to:  [facility]    PUI form faxed to: [facility].

## 2022-02-16 NOTE — PROGRESS NOTES
Patient:   MEERA CONTRERAS            MRN: [id number]            FIN: [id number]               Age:   25 years     Sex:  Female     :  1995   Associated Diagnoses:   Acute sinusitis   Author:   Jelani Manning MD      Chief Complaint   3/4/2021 11:32 AM CST    MONO-LIKE Sx, negative tests for strep, flu, and covid.        History of Present Illness             The patient presents with sinus problem.  The sinus problem is located in the frontal sinus.  The sinus problem is characterized by nasal congestion, rhinorrhea, facial pain and not headache.  The severity of the sinus problem is mild.  The sinus problem is worsening.  The sinus problem has lasted for 5 day(s).  The context of the sinus problem: occurred in association with illness.  Exacerbating factors consist of movement and turning head.  Relieving factors consist of analgesics and decongestant.  Associated symptoms consist of cough, ear pain, sore throat and denies dizziness.  Complaint: has had 2 covid vaccines and recent negative test  had negative strep and flu    cold symptoms a little worse with fever last night and more facial pain.        Review of Systems   Constitutional:  Negative except as documented in history of present illness.    Respiratory:  Cough, No shortness of breath.    Gastrointestinal:  No nausea, No vomiting.    Integumentary:  No rash.       Health Status   Allergies:    Allergic Reactions (Selected)  Severity Not Documented  Clindamycin (Diarrhea)   Medications:  (Selected)   Prescriptions  Prescribed  ARIPiprazole 5 mg oral tablet: = 1 tab(s) ( 5 mg ), Oral, daily, # 90 EA, 1 Refill(s), Type: Maintenance, Pharmacy: [facility] #[id number], 1 tab(s) Oral daily, 70.75, in, 10/09/20 8:27:00 CDT, Height Measured, 292, lb, 10/09/20 8:27:00 CDT, Weight Measured  FLUoxetine 10 mg oral capsule: = 3 cap(s), Oral, daily, # 270 cap(s), 1 Refill(s), ISIAH, Type: Maintenance, Pharmacy: [facility] #[id number], 3 cap(s) Oral  daily, 70.75, in, 10/09/20 8:27:00 CDT, Height Measured, 292, lb, 10/09/20 8:27:00 CDT, Weight Measured  Flonase 50 mcg/inh nasal spray: = 2 spray(s), nasal, daily, # 16 gm, 3 Refill(s), Type: Maintenance, Pharmacy: [facility] #[id number], 2 spray(s) Nasal daily, 70.75, in, 03/04/21 11:32:00 CST, Height Measured, 299, lb, 03/04/21 11:32:00 CST, Weight Measured  amoxicillin 875 mg oral tablet: = 1 tab(s) ( 875 mg ), PO, BID, # 20 tab(s), 0 Refill(s), Type: Maintenance, Pharmacy: [facility] #[id number], 1 tab(s) Oral bid,x10 day(s), 70.75, in, 03/04/21 11:32:00 CST, Height Measured, 299, lb, 03/04/21 11:32:00 CST, Weight Measured  fluconazole 150 mg oral tablet: = 1 tab(s) ( 150 mg ), Oral, once, # 1 tab(s), 1 Refill(s), Type: Soft Stop, Pharmacy: [facility] #[id number], 1 tab(s) Oral once, 70.75, in, 03/04/21 11:32:00 CST, Height Measured, 299, lb, 03/04/21 11:32:00 CST, Weight Measured  nicotine 14 mg/24 hr transdermal film, extended release: 1 patch(es), Topical, daily, Instructions: start and use prior to 7mg dose, # 14 patch(es), 0 Refill(s), Type: Maintenance, Pharmacy: [facility] #[id number], 1 patch(es) Topical daily,x14 day(s),Instr:start and use prior to 7mg dose, 70.75, in, 1...  nicotine 7 mg/24 hr transdermal film, extended release: 1 patch(es), Topical, daily, Instructions: after 14 days of 14 mg nicotine, # 14 patch(es), 0 Refill(s), Type: Maintenance, Pharmacy: [facility] #[id number], 1 patch(es) Topical daily,x14 day(s),Instr:after 14 days of 14 mg nicotine, 70.75, in, 1...  Documented Medications  Documented  Allegra: po, 0 Refill(s), Type: Maintenance  Kyleena 19.5 mg intrauterine device: = 1 EA ( 19.5 mg ), Intrauteral, once, Instructions: inserted 2/22/19, 0 Refill(s), Type: Maintenance,    Medications          *denotes recorded medication          FLUoxetine 10 mg oral capsule: 3 cap(s), Oral, daily, 270 cap(s), 1 Refill(s).          amoxicillin 875 mg oral tablet: 875  mg, 1 tab(s), PO, BID, for 10 day(s), 20 tab(s), 0 Refill(s).          ARIPiprazole 5 mg oral tablet: 5 mg, 1 tab(s), Oral, daily, 90 EA, 1 Refill(s).          *Allegra: po, 0 Refill(s).          fluconazole 150 mg oral tablet: 150 mg, 1 tab(s), Oral, once, 1 tab(s), 1 Refill(s).          Flonase 50 mcg/inh nasal spray: 2 spray(s), nasal, daily, 16 gm, 3 Refill(s).          *Kyleena 19.5 mg intrauterine device: 19.5 mg, 1 EA, Intrauteral, once, inserted 2/22/19, 0 Refill(s).          nicotine 14 mg/24 hr transdermal film, extended release: 1 patch(es), Topical, daily, for 14 day(s), start and use prior to 7mg dose, 14 patch(es), 0 Refill(s).          nicotine 7 mg/24 hr transdermal film, extended release: 1 patch(es), Topical, daily, for 14 day(s), after 14 days of 14 mg nicotine, 14 patch(es), 0 Refill(s).       Problem list:    All Problems (Selected)  Obesity / [id number] / Probable  Tobacco user / [id number] / Probable  Anxiety / [id number] / Confirmed  Depressive disorder / [id number] / Confirmed      Histories   Past Medical History:    No active or resolved past medical history items have been selected or recorded.   Family History:    No family history items have been selected or recorded.   Procedure history:    Encounter for insertion of intrauterine contraceptive device (ICD-10-CM Z30.430) performed by Miguelina Bush on 2/22/2019 at 23 Years.  Comments:  2/24/2019 8:02 AM ATIF - Jennifer Warren LPN IUD insertion  Lot# [id number]  Exp. 01/2021    Due for removal 2/22/24  Extraction of wisdom tooth (SNOMED CT 021285044).   Social History:        Electronic Cigarette/Vaping Assessment            Electronic Cigarette Use: vapes.  1/2 cartridge/day Use Per Day.      Alcohol Assessment            Current, 1 TIME, 4 drinks/episode average.  5 drinks/episode maximum.      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 [employer]        Physical Examination   Vital Signs   3/4/2021 11:32 AM CST Temperature Tympanic 98.4 DegF    Peripheral Pulse Rate 98 bpm    Pulse Site Radial artery    HR Method Manual    Systolic Blood Pressure 126 mmHg    Diastolic Blood Pressure 86 mmHg  HI    Mean Arterial Pressure 99 mmHg    BP Site Right arm    BP Method Manual    Oxygen Saturation 97 %      Measurements from flowsheet : Measurements   3/4/2021 11:32 AM CST Height Measured - Standard 70.75 in    Weight Measured - Standard 299 lb    BSA 2.6 m2    Body Mass Index 41.99 kg/m2  HI      General:  Alert and oriented, No acute distress.    Eye:  Pupils are equal, round and reactive to light, Normal conjunctiva.    HENT:  Oral mucosa is moist, tender over bilateral frontal areas.    Neck:  Supple, No lymphadenopathy.    Respiratory:  Lungs are clear to auscultation, Respirations are non-labored.    Cardiovascular:  Normal rate, Regular rhythm, No edema.    Gastrointestinal:  Non-distended.    Musculoskeletal:  Normal gait.    Integumentary:  Warm, No rash.    Psychiatric:  Cooperative, Appropriate mood & affect, Normal judgment.       Impression and Plan   Diagnosis     Acute sinusitis (TCZ24-FL J01.90).     Plan:  discussed sinus infections and what to watch for and when to return  reviewed Nicolasa pots, steam and saline washes  talked about sudafed and decongestent effectsa nd side effects  reviewed Afrin but the absolute time limit of 5 days.    Orders     Orders (Selected)   Prescriptions  Prescribed  Flonase 50 mcg/inh nasal spray: = 2 spray(s), nasal, daily, # 16 gm, 3 Refill(s), Type: Maintenance, Pharmacy: [facility] #[id number], 2 spray(s) Nasal daily, 70.75, in, 03/04/21 11:32:00 CST, Height Measured, 299, lb, 03/04/21 11:32:00 CST, Weight Measured  amoxicillin 875 mg oral tablet: = 1 tab(s) ( 875 mg ), PO, BID, # 20  tab(s), 0 Refill(s), Type: Maintenance, Pharmacy: [facility] #[id number], 1 tab(s) Oral bid,x10 day(s), 70.75, in, 03/04/21 11:32:00 CST, Height Measured, 299, lb, 03/04/21 11:32:00 CST, Weight Measured  fluconazole 150 mg oral tablet: = 1 tab(s) ( 150 mg ), Oral, once, # 1 tab(s), 1 Refill(s), Type: Soft Stop, Pharmacy: [facility] #[id number], 1 tab(s) Oral once, 70.75, in, 03/04/21 11:32:00 CST, Height Measured, 299, lb, 03/04/21 11:32:00 CST, Weight Measured.

## 2022-02-16 NOTE — NURSING NOTE
Comprehensive Intake Entered On:  12/17/2020 5:41 PM CST    Performed On:  12/17/2020 5:34 PM CST by Jennifer Warren LPN               Summary   Chief Complaint :   blood in stool, bright red, feels that it was bleeding a lot, started today, had 3 bowel movements, had a harder time with the bowel movements, denies any rectal pain   Menstrual Status :   Prophylaxis   Weight Measured :   295.8 lb(Converted to: 295 lb 13 oz, 134.173 kg)    Height Measured :   70.75 in(Converted to: 5 ft 11 in, 179.70 cm)    Body Mass Index :   41.54 kg/m2 (HI)    Body Surface Area :   2.59 m2   Systolic Blood Pressure :   120 mmHg   Diastolic Blood Pressure :   82 mmHg (HI)    Mean Arterial Pressure :   95 mmHg   Peripheral Pulse Rate :   95 bpm   BP Site :   Right arm   BP Method :   Manual   Temperature Tympanic :   97.5 DegF(Converted to: 36.4 DegC)  (LOW)    Oxygen Saturation :   98 %   Race :      Languages :   English   Ethnicity :   Not  or    Jennifer Warren LPN - 12/17/2020 5:34 PM CST   Health Status   Allergies Verified? :   Yes   Medication History Verified? :   Yes   Immunizations Current :   No   Medical History Verified? :   No   Pre-Visit Planning Status :   Completed   Tobacco Use? :   Former smoker   Jennifer Warren LPN - 12/17/2020 5:34 PM CST   Meds / Allergies   (As Of: 12/17/2020 5:41:39 PM CST)   Allergies (Active)   clindamycin  Estimated Onset Date:   Unspecified ; Reactions:   diarrhea ; Created By:   Sophia Nuñez CMA; Reaction Status:   Active ; Category:   Drug ; Substance:   clindamycin ; Type:   Allergy ; Updated By:   Sophia Nuñez CMA; Reviewed Date:   10/9/2020 8:34 AM CDT        Medication List   (As Of: 12/17/2020 5:41:39 PM CST)   Prescription/Discharge Order    aripiprazole  :   aripiprazole ; Status:   Prescribed ; Ordered As Mnemonic:   ARIPiprazole 5 mg oral tablet ; Simple Display Line:   5 mg, 1 tab(s), Oral, daily, 90 EA, 1 Refill(s) ; Ordering Provider:    Miguelina Bush; Catalog Code:   aripiprazole ; Order Dt/Tm:   10/9/2020 8:54:14 AM CDT          FLUoxetine  :   FLUoxetine ; Status:   Prescribed ; Ordered As Mnemonic:   FLUoxetine 10 mg oral capsule ; Simple Display Line:   3 cap(s), Oral, daily, 270 cap(s), 1 Refill(s) ; Ordering Provider:   Miguelina Bush; Catalog Code:   FLUoxetine ; Order Dt/Tm:   10/9/2020 8:54:41 AM CDT          nicotine  :   nicotine ; Status:   Prescribed ; Ordered As Mnemonic:   nicotine 7 mg/24 hr transdermal film, extended release ; Simple Display Line:   1 patch(es), Topical, daily, for 14 day(s), after 14 days of 14 mg nicotine, 14 patch(es), 0 Refill(s) ; Ordering Provider:   Miguelina Bush; Catalog Code:   nicotine ; Order Dt/Tm:   10/9/2020 8:53:20 AM CDT          nicotine  :   nicotine ; Status:   Prescribed ; Ordered As Mnemonic:   nicotine 14 mg/24 hr transdermal film, extended release ; Simple Display Line:   1 patch(es), Topical, daily, for 14 day(s), start and use prior to 7mg dose, 14 patch(es), 0 Refill(s) ; Ordering Provider:   Miguelina Bush; Catalog Code:   nicotine ; Order Dt/Tm:   10/9/2020 8:53:14 AM CDT            Home Meds    levonorgestrel  :   levonorgestrel ; Status:   Documented ; Ordered As Mnemonic:   Kyleena 19.5 mg intrauterine device ; Simple Display Line:   19.5 mg, 1 EA, Intrauteral, once, inserted 2/22/19, 0 Refill(s) ; Catalog Code:   levonorgestrel ; Order Dt/Tm:   10/9/2020 8:35:49 AM CDT          fluticasone nasal  :   fluticasone nasal ; Status:   Documented ; Ordered As Mnemonic:   Flonase 50 mcg/inh nasal spray ; Simple Display Line:   1 spray(s), nasal, prn ; Catalog Code:   fluticasone nasal ; Order Dt/Tm:   10/20/2015 1:18:23 PM CDT          fexofenadine  :   fexofenadine ; Status:   Documented ; Ordered As Mnemonic:   Allegra ; Simple Display Line:   po, 0 Refill(s) ; Catalog Code:   fexofenadine ; Order Dt/Tm:   5/25/2016 10:39:53 AM CDT            ID Risk Screen   Recent  Travel History :   No recent travel   Family Member Travel History :   No recent travel   Other Exposure to Infectious Disease :   Unknown   Jennifer Warren LPN - 12/17/2020 5:34 PM CST   Social History   Social History   (As Of: 12/17/2020 5:41:39 PM CST)   Alcohol:        Current, 1 TIME, 4 drinks/episode average.  5 drinks/episode maximum.   (Last Updated: 1/10/2019 3:16:46 PM CST by Leatha Means)          Tobacco:        Former smoker, quit more than 30 days ago   (Last Updated: 12/17/2020 5:39:32 PM CST by Jennifer Warren LPN)          Electronic Cigarette/Vaping:        Electronic Cigarette Use: vapes.  1/2 cartridge/day Use Per Day.   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

## 2022-02-16 NOTE — NURSING NOTE
Generalized Anxiety Disorder Screening Entered On:  1/10/2019 3:16 PM CST    Performed On:  1/10/2019 3:14 PM CST by Leatha Means               Generalized Anxiety Disorder Screening   CHACHO Nervous, Anxious On Edge :   More than half the days   CHACHO Control Worrying B :   Several days   CHACHO Worrying Too Much :   Nearly every day   CHACHO Restless :   Several days   CHACHO Easily Annoyed/Irritable :   Several days   CHACHO Afraid :   Several days   CHACHO Trouble Relaxing :   Nearly every day   CHACHO Total Screening Score :   12    CHACHO Difficulty with Work, Home, Others :   Somewhat difficult   Leatha Means - 1/10/2019 3:14 PM CST

## 2022-02-16 NOTE — TELEPHONE ENCOUNTER
---------------------  From: Madonna Mark   Sent: 9/23/2020 3:48:20 PM CDT  Subject: Curbside Testing     Patient was in for curbside testing.   Per NARINDER       O2 sat= 97%  Specimen sent to [facility].     Forms faxed to [facility]      Priority# 0

## 2022-02-16 NOTE — NURSING NOTE
Comprehensive Intake Entered On:  9/23/2020 2:47 PM CDT    Performed On:  9/23/2020 2:45 PM CDT by Anali Goncalves CMA   Chief Complaint :   consent for video visit fot STand fatigue   Menstrual Status :   Prophylaxis   Race :      Languages :   English   Ethnicity :   Not  or    GoncalvesAnali palmer CMA - 9/23/2020 2:45 PM CDT   Health Status   Allergies Verified? :   Yes   Medication History Verified? :   Yes   Immunizations Current :   No   Medical History Verified? :   Yes   Tobacco Use? :   Current every day smoker   Anali Goncalves CMA - 9/23/2020 2:45 PM CDT   Consents   Consent for Immunization Exchange :   Consent Granted   Consent for Immunizations to Providers :   Consent Granted   Anali Goncalves CMA - 9/23/2020 2:45 PM CDT   Meds / Allergies   (As Of: 9/23/2020 2:47:02 PM CDT)   Allergies (Active)   clindamycin  Estimated Onset Date:   Unspecified ; Reactions:   diarrhea ; Created By:   Sophia Nuñez CMA; Reaction Status:   Active ; Category:   Drug ; Substance:   clindamycin ; Type:   Allergy ; Updated By:   Sophia Nuñez CMA; Reviewed Date:   9/23/2020 2:46 PM CDT        Medication List   (As Of: 9/23/2020 2:47:02 PM CDT)   Prescription/Discharge Order    albuterol  :   albuterol ; Status:   Processing ; Ordered As Mnemonic:   albuterol CFC free 90 mcg/inh inhalation aerosol ; Ordering Provider:   Miguelina Bush; Action Display:   Complete ; Catalog Code:   albuterol ; Order Dt/Tm:   9/23/2020 2:46:36 PM CDT          busPIRone  :   busPIRone ; Status:   Processing ; Ordered As Mnemonic:   busPIRone 10 mg oral tablet ; Ordering Provider:   Shira Hester; Action Display:   Complete ; Catalog Code:   busPIRone ; Order Dt/Tm:   9/23/2020 2:46:36 PM CDT          FLUoxetine  :   FLUoxetine ; Status:   Prescribed ; Ordered As Mnemonic:   FLUoxetine 10 mg oral capsule ; Simple Display Line:   3 cap(s), Oral, daily, 30 cap(s), 0 Refill(s) ; Ordering  Provider:   Tracy Horton MD; Catalog Code:   FLUoxetine ; Order Dt/Tm:   8/7/2019 1:59:13 PM CDT            Home Meds    fexofenadine  :   fexofenadine ; Status:   Documented ; Ordered As Mnemonic:   Allegra ; Simple Display Line:   po, 0 Refill(s) ; Catalog Code:   fexofenadine ; Order Dt/Tm:   5/25/2016 10:39:53 AM CDT          fluticasone nasal  :   fluticasone nasal ; Status:   Documented ; Ordered As Mnemonic:   Flonase 50 mcg/inh nasal spray ; Simple Display Line:   1 spray(s), nasal, prn ; Catalog Code:   fluticasone nasal ; Order Dt/Tm:   10/20/2015 1:18:23 PM CDT          levonorgestrel  :   levonorgestrel ; Status:   Documented ; Ordered As Mnemonic:   Keshia 13.5 mg intrauteral device ; Simple Display Line:   13.5 mg, 1 EA, intrauteral, once, Lot [id number], inserted 2/9/16, 0 Refill(s) ; Catalog Code:   levonorgestrel ; Order Dt/Tm:   2/9/2016 3:45:21 PM CST            ID Risk Screen   Recent Travel History :   No recent travel   Family Member Travel History :   No recent travel   Other Exposure to Infectious Disease :   Unknown   Anali Goncalves CMA - 9/23/2020 2:45 PM CDT

## 2022-02-16 NOTE — NURSING NOTE
CAGE Assessment Entered On:  10/12/2020 9:52 AM CDT    Performed On:  10/9/2020 9:52 AM CDT by Soledad Bourgeois               Assessment   Have you ever felt you should cut down on your drinking :   No   Have people annoyed you by criticizing your drinking :   No   Have you ever felt bad or guilty about your drinking :   No   Have you ever taken a drink first thing in the morning to steady your nerves or get rid of a hangover (Eye-opener) :   No   CAGE Score :   0    Soledad Bourgeois - 10/12/2020 9:52 AM CDT

## 2022-02-16 NOTE — PROGRESS NOTES
Patient:   MEERA CONTRERAS            MRN: [id number]            FIN: [id number]               Age:   22 years     Sex:  Female     :  1995   Associated Diagnoses:   Lumbar pain; Rectal pain   Author:   Shira Hester      Chief Complaint   2018 3:24 PM CDT    Pelvic pain, rectal pressure, low libido.        History of Present Illness   PPC to follow up with pelvic floor pain and rectal pressure  she had pelvic ultrasound and was told it was normal, IUD in place  she has continued with pelvic floor pain which is centered around rectum and worse after seated driving for long periods of time  she has had no leg pain, has 2-3 BMs daily but kathy dairrhea, no fy hx of chrohns or colitis, no blood in stools and no hemorrhoids  does not engage in anal intercourse  hx of lumbar back pain    low libido over past year, does not feel she has additional stress or anxiety, does not feel it is related to irene as her libido was fine for first two years of IUD use      Review of Systems   Constitutional:  Negative.    Respiratory:  Negative.    Cardiovascular:  Negative.    Gastrointestinal:  Negative except as documented in history of present illness.    Genitourinary:  Negative except as documented in history of present illness.    Gynecologic:  Negative except as documented in history of present illness.    Immunologic:  Negative.    Musculoskeletal:       Back pain: In the lower region.    Integumentary:  Negative.    Neurologic:  Negative.    Psychiatric:  Negative.              Health Status   Allergies:    Allergic Reactions (Selected)  Severity Not Documented  Clindamycin (Diarrhea)   Medications:  (Selected)   Prescriptions  Prescribed  BuSpar 10 mg oral tablet: 1 tab(s) ( 10 mg ), PO, TID, # 90 tab(s), 1 Refill(s), Type: Maintenance, Pharmacy: [id number], 1 tab(s) po tid  FLUoxetine 10 mg oral capsule: 3 cap(s) ( 30 mg ), po, daily, # 270 cap(s), 3 Refill(s), Type: Maintenance, Pharmacy:  [id number], pt due now for med check follow up per [facility] 3 cap(s) po daily  albuterol CFC free 90 mcg/inh inhalation aerosol: 2 puff(s), inh, qid, Instructions: dispense with Valved Chamber please  use with spacer chamber, PRN: as needed for wheezing, # 18 gm, 0 Refill(s), Type: Maintenance, Pharmacy: [facility], 2 puff(s) inh qid,PRN:as needed for wheezing,I...  Documented Medications  Documented  Allegra: po, 0 Refill(s), Type: Maintenance  Flonase 50 mcg/inh nasal spray: 1 spray(s), nasal, prn, Type: Maintenance  Keshia 13.5 mg intrauteral device: 1 EA ( 13.5 mg ), intrauteral, once, Instructions: Lot [id number], inserted 2/9/16, 0 Refill(s), Type: Maintenance   Problem list:    All Problems (Selected)  Anxiety / SNOMED CT 9654441530 / Confirmed  Depressive disorder / SNOMED CT 8571599749 / Confirmed  Obesity / SNOMED CT 9021775921 / Probable  Tobacco user / SNOMED CT 786968321 / Probable      Histories   Past Medical History:    No active or resolved past medical history items have been selected or recorded.      Physical Examination   Vital Signs   6/14/2018 3:24 PM CDT Temperature Tympanic 98.5 DegF    Peripheral Pulse Rate 90 bpm    Pulse Site Radial artery    HR Method Manual    Systolic Blood Pressure 124 mmHg    Diastolic Blood Pressure 90 mmHg  HI    Mean Arterial Pressure 101 mmHg    BP Site Right arm    BP Method Manual      Measurements from flowsheet : Measurements   6/14/2018 3:24 PM CDT    Weight Measured - Standard                278 lb     General:  Alert and oriented, No acute distress.    Eye:  Pupils are equal, round and reactive to light.    HENT:  Normocephalic.    Respiratory:  Respirations are non-labored.    Cardiovascular:  Regular rhythm, No edema.    Gastrointestinal:  Soft, Non-tender, Non-distended, Normal bowel sounds, No organomegaly, rectal exam reveals formed stools, no lesions or masses.    Genitourinary:  No costovertebral angle tenderness.     Musculoskeletal:  Normal range of motion, Normal gait.    Integumentary:  Warm, Dry, Pink.    Neurologic:  Alert, Oriented, Normal sensory, No focal deficits.    Psychiatric:  Cooperative, Appropriate mood & affect, Normal judgment.       Impression and Plan   Diagnosis     Lumbar pain (OWW84-XI M54.5).     Rectal pain (DYI39-IC K62.89).     Plan:  will obtain films of lumbar spine and sacrum, will call pt with results  I would like to add in PT, she sees chiropractor and can continue to do this but I would like add in PT and see if there is benefit  if therapy does not help we will need to consider additional lower abdominal imaging  regarding low libido this is hard as in most cases it is related to stress/anxiety which she feels is well controlled  it is possible her fluoxetine is adding to this but she does not want to alter the dose, she can consider using buspar on regular basis and see if this helps as some research has shown this to be beneficial but in the end it may be because buspar helped to control anxiety better overall.

## 2022-02-16 NOTE — TELEPHONE ENCOUNTER
---------------------  From: Starla Guevara (Phone Messages Pool ([id number]_[facility]))   To: [facility] Message Pool ([id number]);     Sent: 9/21/2021 11:20:58 AM CDT  Subject: Yeast infection      Phone Message    PCP:   Asked for GJM but he is OC, requested message sent to [facility] to address.                             Time of Call:  1100am                                        Person Calling:  Pt   Phone number:  [phone]; ok to leave detailed message     Note:   Pt started on Effexor. Since starting pt developed a yeast infection. She was researching and read that this medication can cause mucus membrane to dry up. Pt is wondering if she could get an Rx to help with symptoms. She has a history of yeast infection when taking abx as well. She has tried Monistat, but does not see any relief this with. Please advise.     Last office visit and reason:  09/15/2021; anxiety.---------------------  From: Jennifer Warren LPN ([facility] Message Pool ([id number]_[facility]))   To: Miguelina Bush;     Sent: 9/21/2021 11:52:48 AM CDT  Subject: FW: Yeast infection---------------------  From: Miguelina Bush   To: [facility] Message Pool ([id number]);     Sent: 9/21/2021 11:54:24 AM CDT  Subject: RE: Yeast infection      I sent rx to [id number] Pt notified that rx has been sent.

## 2022-02-16 NOTE — NURSING NOTE
Generalized Anxiety Disorder Screening Entered On:  10/12/2020 9:53 AM CDT    Performed On:  10/9/2020 9:52 AM CDT by Soledad Bourgeois               Generalized Anxiety Disorder Screening   CHACHO Nervous, Anxious On Edge :   Several days   CHACHO Control Worrying B :   Several days   CHACHO Worrying Too Much :   More than half the days   CHACHO Trouble Relaxing :   Nearly every day   CHACHO Restless :   More than half the days   CHACHO Easily Annoyed/Irritable :   More than half the days   CHACHO Afraid :   Not at all   CHACHO Total Screening Score :   11    CHACHO Difficulty with Work, Home, Others :   Somewhat difficult   Soledad Bourgeois - 10/12/2020 9:52 AM CDT

## 2022-02-16 NOTE — TELEPHONE ENCOUNTER
---------------------  From: Joselyn MARSHALLMoon   Sent: 12/17/2020 10:43:46 AM CST  Subject: blood in stool     Phone Message    PCP:   YUSUF      Time of Call:  1002       Person Calling:  pt  Phone number:  [phone]    Returned call at: 1040    Note:   Pt states after hard BM today she noticed a lot of bright red blood in toilet and on TP. No pain. Has never happened before. Describes amount as if she had gotten a heavy period. Advised appt since this is new and given the amount of blood noted. Pt agreed and will c/b to schedule an appt since she is at work.      Last office visit and reason:  10/19/20 back pain NCB

## 2022-02-16 NOTE — PROGRESS NOTES
Patient:   MEERA CONTRERAS            MRN: [id number]            FIN: [id number]               Age:   21 years     Sex:  Female     :  1995   Associated Diagnoses:   Acute sinusitis   Author:   Suzy Jarvis      Visit Information      Date of Service: 2017 02:41 pm  Performing Location: [facility]  Encounter#: [id number]      Chief Complaint   2017 2:47 PM CST     Patient is here with c/o sinus pain and pressure for past 2 weeks.  Productive cough. dizzy.        History of Present Illness   Chief complaint reviewed and confirmed with patient.              The patient presents with sinus problem.  The sinus problem is located in the maxillary sinus.  The sinus problem is characterized by nasal congestion, rhinorrhea, headache and facial pain.  The severity of the sinus problem is moderate.  The sinus problem is constant.  The sinus problem has lasted for 14 day(s).  Exacerbating factors consist of none.  Relieving factors consist of analgesics.  Associated symptoms consist of cough, ear pain and Vertigo.     History of asthma. Currently not an issue.      Review of Systems   Constitutional:  Negative except as documented in history of present illness.    Eye:  Negative.    Ear/Nose/Mouth/Throat:  Negative except as documented in history of present illness.    Respiratory:  Negative except as documented in history of present illness.    Cardiovascular:  Negative.    Gastrointestinal:  Negative.    Musculoskeletal:  Negative.       Health Status   Allergies:    Allergic Reactions (Selected)  Severity Not Documented  Clindamycin (Diarrhea)   Medications:  (Selected)   Prescriptions  Prescribed  Augmentin 875 mg oral tablet: 1 tab(s), PO, q12hr, # 20 tab(s), 0 Refill(s), Type: Maintenance, Pharmacy: [id number], 1 tab(s) po q12 hrs,x10 day(s)  Diflucan 150 mg oral tablet: 1 tab(s) ( 150 mg ), PO, Once, Instructions: repeat once in 4 days, # 2 tab(s), 0 Refill(s),  Type: Soft Stop, Pharmacy: [facility] [id number], 1 tab(s) po once,Instr:repeat once in 4 days  PROzac 10 mg oral capsule: See Instructions, Instructions: take one capsule for 3d then 20mg x3d then 30mg x3d, # 90 cap(s), 1 Refill(s), Type: Maintenance, Pharmacy: [facility] [id number], take one capsule for 3d then 20mg x3d then 30mg x3d  albuterol CFC free 90 mcg/inh inhalation aerosol: 2 puff(s), inh, qid, Instructions: dispense with Valved Chamber please  use with spacer chamber, PRN: as needed for wheezing, # 18 gm, 0 Refill(s), Type: Maintenance, Pharmacy: [id number], 2 puff(s) inh qid,PRN:as needed for wheezing,I...  Documented Medications  Documented  Allegra: po, 0 Refill(s), Type: Maintenance  Flonase 50 mcg/inh nasal spray: 1 spray(s), nasal, prn, Type: Maintenance  Keshia 13.5 mg intrauteral device: 1 EA ( 13.5 mg ), intrauteral, once, Instructions: Lot [id number], inserted 2/9/16, 0 Refill(s), Type: Maintenance   Problem list:    All Problems  Obesity / SNOMED CT 2054171233 / Probable  Tobacco user / SNOMED CT 109156108 / Probable      Physical Examination   General:  Alert and oriented, Mild distress.    Eye:  Normal conjunctiva.    HENT:  Normocephalic, Tympanic membranes are clear.         Ear: Both ears, Within normal limits.         Nose: Discharge ( Moderate amount ), Turbinates ( Boggy, Erythematous ).         Sinus: Bilateral, Maxillary sinus, Tenderness.         Mouth: Within normal limits.         Throat: Pharynx ( Within normal limits ).    Neck:  Supple, No lymphadenopathy.    Respiratory:  Lungs are clear to auscultation.    Cardiovascular:  Normal rate, Regular rhythm.    Neurologic:  Alert, Oriented.    Psychiatric:  Cooperative.       Impression and Plan   Diagnosis     Acute sinusitis (RBS80-BS J01.90).     Course:  Worsening.    Plan:  1) Nasal irrigationi  2) Warm packs  3) Tylenol  4) Antibiotics due to 14 day history  5) RTC if no improvement or prn.

## 2022-02-16 NOTE — TELEPHONE ENCOUNTER
---------------------  From: Ayah Rosas RN (Phone Messages Pool ([id number]_[facility]))   To: [employer] Message Pool (28566[facility]);     Sent: 11/1/2021 3:35:25 PM CDT  Subject: Medication questions        PCP:   YUSUF      Time of Call:  1530       Person Calling:  Pt  Phone number:  [phone]    Note:   Pt calling stating that she was on venlafaxine for about 5-6 weeks and came off of it last week.  She is very frusterated with herself and uncomfortable with how she is feeling right now.  Pt said she is not calling for different medications but doesn't know what to do.  Pt said she is upset if she breathes incorrectly and is just unhappy and upset.  Unsure what to advise for the pt.  She was seen by her therapist today and said it did not help at all.  Any suggestions?    Last office visit and reason:  9/22/21 Genital Sore---------------------  From: Jennifer Warren LPN ([employer] Message Pool ([id number]))   To: Miguelina Bush;     Sent: 11/1/2021 3:43:51 PM CDT  Subject: FW: Medication questions---------------------  From: Miguelina Bush   To: [employer] Message Pool (29619[facility]);     Sent: 11/1/2021 3:56:32 PM CDT  Subject: RE: Medication questions      It may take some time for her to feel content in her decision to stop the medication. If she wants to schedule a video appointment I am happy to 'meet' with her, or in [id number] Spoke with patient, she will f/u with appt. if things are not improving over the next couple of weeks.

## 2022-02-16 NOTE — PROGRESS NOTES
Patient:   MEERA CONTRERAS            MRN: [id number]            FIN: [id number]               Age:   25 years     Sex:  Female     :  1995   Associated Diagnoses:   Acute low back pain   Author:   Miguelina Bush      Report Summary   DiagnosisPatient Instructions:       Counseled: Patient, Regarding diagnosis, Regarding medications, Activity, Verbalized understanding, given stretches to start  need to medicate with OTC 800mg ibuprofen tid with food for 5 days and muscle relaxer, ice and heat  schedule PT for next week as this will ikely be recurrent  note written for work. Orders  Orders (Selected)   Outpatient Orders  Ordered  PT Evaluation and Treatment (Request): Low back pain  Prescriptions  Prescribed  cyclobenzaprine 10 mg oral tablet: = 1 tab(s) ( 10 mg ), Oral, tid, PRN: for spasm, # 30 tab(s), 1 Refill(s), Type: Maintenance, Pharmacy: [facility] #[id number], 1 tab(s) Oral tid,PRN:for spasm, 70.75, in, 10/19/20 13:14:00 CDT, Height Measured, 295.1, lb, 10/19/20 13:14:00 CDT,....      1. Dx and Plan      Chief Complaint   10/19/2020 1:14 PM CDT   lower back pain, along the spine and wraps around the right side, last night bent over to grab keys and back gave out and she fell down, no pain last night, woke with pain        History of Present Illness   She was bending over last kike to grab keys off the floor and back gave out, felt very tight. Went home to bed and woke this morning with increased pain and tightness right low back. No radicular symptoms  She has played a lot or Rugby over the years, but no specific back injury ever  she has taken nothing for pain and not used ice or heat      Review of Systems   Constitutional:  No fever.    Gastrointestinal:  No fecal incontinence.    Genitourinary:  No urinary incontinence, No urinary retention.    Musculoskeletal:  Back pain.    Neurologic:  No leg weakness, No numbness, No tingling.       Health Status   Allergies:    Allergic Reactions  (Selected)  Severity Not Documented  Clindamycin (Diarrhea)   Medications:  (Selected)   Prescriptions  Prescribed  ARIPiprazole 5 mg oral tablet: = 1 tab(s) ( 5 mg ), Oral, daily, # 90 EA, 1 Refill(s), Type: Maintenance, Pharmacy: [facility] #[id number], 1 tab(s) Oral daily, 70.75, in, 10/09/20 8:27:00 CDT, Height Measured, 292, lb, 10/09/20 8:27:00 CDT, Weight Measured  FLUoxetine 10 mg oral capsule: = 3 cap(s), Oral, daily, # 270 cap(s), 1 Refill(s), ISIAH, Type: Maintenance, Pharmacy: [facility] #[id number], 3 cap(s) Oral daily, 70.75, in, 10/09/20 8:27:00 CDT, Height Measured, 292, lb, 10/09/20 8:27:00 CDT, Weight Measured  cyclobenzaprine 10 mg oral tablet: = 1 tab(s) ( 10 mg ), Oral, tid, PRN: for spasm, # 30 tab(s), 1 Refill(s), Type: Maintenance, Pharmacy: [facility] #[id number], 1 tab(s) Oral tid,PRN:for spasm, 70.75, in, 10/19/20 13:14:00 CDT, Height Measured, 295.1, lb, 10/19/20 13:14:00 CDT,...  nicotine 14 mg/24 hr transdermal film, extended release: 1 patch(es), Topical, daily, Instructions: start and use prior to 7mg dose, # 14 patch(es), 0 Refill(s), Type: Maintenance, Pharmacy: [facility] #[id number], 1 patch(es) Topical daily,x14 day(s),Instr:start and use prior to 7mg dose, 70.75, in, 1...  nicotine 7 mg/24 hr transdermal film, extended release: 1 patch(es), Topical, daily, Instructions: after 14 days of 14 mg nicotine, # 14 patch(es), 0 Refill(s), Type: Maintenance, Pharmacy: [facility] #[id number], 1 patch(es) Topical daily,x14 day(s),Instr:after 14 days of 14 mg nicotine, 70.75, in, 1...  Documented Medications  Documented  Allegra: po, 0 Refill(s), Type: Maintenance  Flonase 50 mcg/inh nasal spray: 1 spray(s), nasal, prn, Type: Maintenance  Kyleena 19.5 mg intrauterine device: = 1 EA ( 19.5 mg ), Intrauteral, once, Instructions: inserted 2/22/19, 0 Refill(s), Type: Maintenance   Problem list:    All Problems  Anxiety / SNOMED CT 2660479212 / Confirmed  Depressive  disorder / SNOMED CT 4743845604 / Confirmed  Obesity / SNOMED CT 5624222017 / Probable  Tobacco user / SNOMED CT 376657992 / Probable  Canceled: Viral URI with cough / SNOMED CT 89794305      Histories   Past Medical History:    No active or resolved past medical history items have been selected or recorded.   Family History:    No family history items have been selected or recorded.   Procedure history:    Encounter for insertion of intrauterine contraceptive device (ICD-10-CM Z30.430) performed by Miguelina Bush on 2/22/2019 at 23 Years.  Comments:  2/24/2019 8:02 AM ATIF - Jennifer Warren LPN IUD insertion  Lot# [id number]  Exp. 01/2021    Due for removal 2/22/24  Extraction of wisdom tooth (SNOMED CT 058439761).   Social History:        Alcohol Assessment            Current, 1 TIME, 4 drinks/episode average.  5 drinks/episode maximum.      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    Musculoskeletal:  Normal strength.         Spine/torso exam: No spinal tenderness but states most tenderness is lumbar area to the right of the spline. no rash noted, Lumbar flexion decreased approximately 20 degree, lumbar extension normal at approximately 25 degrees, right side  bend normal at approximately 60 degrees, left side bend normal at approximately 60 degree..    Integumentary:  Warm, Dry, Pink, No rash.    Neurologic:  Normal sensory, No focal deficits.       Review / Management   Differential diagnosis:  Musculoskeletal Back Pain.  No evidence of CA, No fever, No focal neurological findings..       Impression and Plan   Diagnosis     Acute low back pain (AKC21-ZB M54.5).     Patient Instructions:       Counseled: Patient, Regarding diagnosis, Regarding medications, Activity, Verbalized understanding, given stretches to start  need to medicate with OTC 800mg ibuprofen tid with food for 5 days and muscle relaxer, ice and heat  schedule PT for next week as this will ikely be recurrent  note written for work.    Orders     Orders (Selected)   Outpatient Orders  Ordered  PT Evaluation and Treatment (Request): Low back pain  Prescriptions  Prescribed  cyclobenzaprine 10 mg oral tablet: = 1 tab(s) ( 10 mg ), Oral, tid, PRN: for spasm, # 30 tab(s), 1 Refill(s), Type: Maintenance, Pharmacy: [facility] #[id number], 1 tab(s) Oral tid,PRN:for spasm, 70.75, in, 10/19/20 13:14:00 CDT, Height Measured, 295.1, lb, 10/19/20 13:14:00 CDT,....     Dx and Plan

## 2022-02-16 NOTE — TELEPHONE ENCOUNTER
---------------------  From: Hilda Azar RN (Phone Messages Pool (54624[facility]))   To: [facility] Message Pool ([address] [place] [address]);     Sent: 3/3/2021 2:19:58 PM CST  Subject: General Message     Phone Message    PCP:   YUSUF      Time of Call:  1359       Person Calling:  Pt  Phone number:  [phone]    Returned call at: _    Note:   Patient called regarding visit from yesterday. She states that her employer is requiring signed note from [facility] stating she has been tested for COVID - she will not be excused from work unless if it COVID related.  Please advise.     Last office visit and reason:  3/2/21 video visit w/YUSUF---------------------  From: Jennifer Warren LPN ([employer] Message Pool ([id number]))   To: Miguelina Bush;     Sent: 3/3/2021 2:32:04 PM CST  Subject: FW: General Message---------------------  From: Miguelina Bush   To: [facility] Message Pool (24[facility]);     Sent: 3/3/2021 2:37:15 PM CST  Subject: RE: General Message     She should have COVID antigen test results returning today. Please check with Leroy to see if they are back yet, thanksNo results back yet.COVID test is negative.---------------------  From: Jennifer Warren LPN ([facility] Message Pool ([id number]))   To: Miguelina Bush;     Sent: 3/4/2021 7:24:02 AM CST  Subject: FW: General Message---------------------  From: Miguelina Bush   To: [facility] Message Pool ([id number]);     Sent: 3/4/2021 7:37:50 AM CST  Subject: RE: General Message     Please write a note for her that excuses her from work through today due to illness. Also document on the note that she was tested for COVID19 and her test was negative and did not return till today.  Given a very low possibility of a false negative test and that she has been vaccinated against COVID19, she may now return to work as long as she is afebrile. Please contact her and make sure she is now without fever and feeling better. I believe we had talked  about testing for mono if she is not better. Thanks.[id number] Spoke with patient. She had a fever again last night. Continues to have chest/nasal congestion, sore throat, and chills. Her headache has resolved but the cough seems to have become more frequent. Is it still ok to write note to return to work tomorrow? Do you want her to be tested for Mono?---------------------  From: Jennifer Warren LPN ([employer] Message Pool (32224_[facility]))   To: Miguelina Bush;     Sent: 3/4/2021 8:21:04 AM CST  Subject: FW: General Message---------------------  From: Miguelina Bush   To: [employer] Message Pool (32224_[facility]);     Sent: 3/4/2021 9:31:14 AM CST  Subject: RE: General Message     She needs an assessment in clinic, please have her see a provider in clinic today for full evaluation. [id number] Spoke with patient and informed her that [facility] is recommending an in clinic visit. Pt transferred to scheduling.

## 2022-02-16 NOTE — PROGRESS NOTES
Chief Complaint    Pt here for sinus pressure x 4-5 days.  History of Present Illness      Chief complaint as above reviewed and confirmed with patient.  Pt presents to the clinic with concerns re: uri sx.  Has had cold sx for about 3 weeks but now worsening the last 1 week with pressure, dizziness, facial pain.  Thick brown and yellow discharge a times.  No fevers. No nuasea or vomiting. No rash.  Activity and appetite are unchanged.  No hx of chronic sinus infections or sinus surgery.  Review of Systems      Review of systems is negative with the exception of those noted in HPI          Physical Exam   Vitals & Measurements    T: 98.7   F (Tympanic)  HR: 72(Peripheral)  RR: 16  BP: 122/88  SpO2: 99%     HT: 70.75 in  WT: 284 lb  BMI: 39.89           Vitals as above per nursing documentation           Constitutional : nad appears well          Ears: ears patent B, TMS intact, noninjected           Nose: nasal mucosa is ededmatous. purulent discharge           Throat: pharynx is nonerythematous, no tonsillar hypertrophy, no exudate           Neck: neck supple, no adenopathy, no thyromegaly, no rigidity           Lungs: lungs CTA', no Wheezes, rhonchi or rales           Heart: heart RRR, nl S1, S2 no murmur           skin:  No rashes        TTP of the maxillary sinuses B            Assessment/Plan       1. Acute maxillary sinusitis (J01.00)         amoxil as ordered. push fluids, rest and ibuprofen or tylenol for comfort.  Pt instructed to return to clinic for persistent or worsening symptoms.                  Orders:         amoxicillin, = 1 tab(s) ( 875 mg ), PO, BID, x 10 day(s), # 20 tab(s), 0 Refill(s), Type: Acute, Pharmacy: [facility] [id number], 1 tab(s) Oral bid,x10 day(s), (Ordered)  Patient Information     Name:MEERA CONTRERAS      Address:      [address] [place] [id number]     Sex:Female     YOB: 1995     [phone]     Emergency Contact:DIANE  MITCH     MRN:[id number]     FIN:[id number]     Location:[facility]     Date of Service:04/12/2019      Primary Care Physician:       Shira Hester, [phone]      Attending Physician:       Katy Severino PA-C, [phone]  Problem List/Past Medical History    Ongoing     Anxiety     Depressive disorder     Obesity     Tobacco user    Historical     No qualifying data  Procedure/Surgical History     Encounter for insertion of intrauterine contraceptive device (02/22/2019)            Comments: Kyleena IUD insertion      Lot# [id number]      Exp. 01/2021      Due for removal 2/22/24.     Extraction of wisdom tooth        Medications     Flonase 50 mcg/inh nasal spray: 1 spray(s), nasal, prn.     Keshia 13.5 mg intrauteral device: 13.5 mg, 1 EA, intrauteral, once, Lot [id number], inserted 2/9/16, 0 Refill(s).     albuterol CFC free 90 mcg/inh inhalation aerosol: 2 puff(s), inh, qid, dispense with Valved Chamber please  use with spacer chamber, PRN: as needed for wheezing, 18 gm, 0 Refill(s).     Allegra: po, 0 Refill(s).     busPIRone 10 mg oral tablet: 10 mg, 1 tab(s), PO, TID, 270 tab(s), 1 Refill(s).     FLUoxetine 10 mg oral capsule: 3 cap(s), Oral, daily, 90 cap(s), 1 Refill(s).     amoxicillin 875 mg oral tablet: 875 mg, 1 tab(s), PO, BID, for 10 day(s), 20 tab(s), 0 Refill(s).          Allergies    clindamycin (diarrhea)  Social History    Smoking Status - 04/12/2019     Current every day smoker     Alcohol      Current, 1 TIME, 4 drinks/episode average. 5 drinks/episode maximum., 01/10/2019     Employment and Education      Student, 11/21/2016     Tobacco      Electronic Cigarettes, 02/15/2018  Immunizations      Vaccine Date Status Comments      influenza virus vaccine, inactivated 01/20/2014 Recorded      human papillomavirus vaccine 01/20/2014 Recorded W[facility]      human papillomavirus vaccine 10/29/2013 Recorded WIR      human papillomavirus vaccine 07/16/2013 Recorded       meningococcal conjugate vaccine 07/12/2012 Recorded WIR      influenza, H1N1, inactivated 12/03/2009 Recorded      varicella 04/22/2009 Recorded WIR      meningococcal conjugate vaccine 04/22/2009 Recorded WIR      tetanus/diphth/pertuss (Tdap) adult/adol 04/22/2009 Recorded WIR      DTaP 12/09/1999 Recorded WIR      Hib (PRP-T) 12/09/1999 Recorded WIR      MMR (measles/mumps/rubella) 12/09/1999 Recorded WIR      IPV 12/09/1999 Recorded WIR      varicella 12/10/1998 Recorded WIR      DTaP 11/19/1996 Recorded WIR      MMR (measles/mumps/rubella) 10/01/1996 Recorded WIR      hepatitis B pediatric vaccine 04/26/1996 Recorded WIR      DTaP 01/11/1996 Recorded WIR      Hib (PRP-T) 01/11/1996 Recorded WIR      IPV 01/11/1996 Recorded WIR      DTaP 1995 Recorded WIR      Hib (PRP-T) 1995 Recorded WIR      IPV 1995 Recorded WIR      hepatitis B pediatric vaccine 1995 Recorded WIR      DTaP 1995 Recorded WIR      hepatitis B pediatric vaccine 1995 Recorded WIR      Hib (PRP-T) 1995 Recorded WIR      IPV 1995 Recorded WIR  Lab Results       Lab Results (Last 4 results within 90 days)        U HCG POC: NEGATIVE [NEGATIVE  - NEGATIVE] (02/22/19 16:21:00)       Chlam/N. gonorrhea Comments: See comment (02/22/19 17:02:00)       Chlamydia RNA: NOT DETECTED (02/22/19 17:02:00)       Neisseria gonorrhoeae RNA: NOT DETECTED (02/22/19 17:02:00)

## 2022-02-16 NOTE — LETTER
(Inserted Image. Unable to display)   August 09, 2019      MEERA CONTRERAS  [address]  [place], [place] [id number]        Dear MEERA,      Thank you for selecting [facility] (previously [facility]) for your healthcare needs.     Our records indicate you are due for the following services:     Medication Check    To schedule an appointment or if you have further questions, please contact your primary clinic:   [facility]          [phone]   [facility]    [phone]             [facility]         [phone]      Powered by [employer]    Sincerely,    Tracy Horton M.D.

## 2022-02-16 NOTE — TELEPHONE ENCOUNTER
Entered by Kelly Burgos CMA on October 26, 2021 7:20:59 AM CDT  ---------------------  From: Kelly Burgos CMA   To: [facility] #[id number]    Sent: 10/26/2021 7:20:58 AM CDT  Subject: Medication Management     ** Submitted: **  Order:venlafaxine (venlafaxine 75 mg oral capsule, extended release)  1 cap(s)  Oral  daily  Qty:  30 cap(s)        Days Supply:  30        Refills:  0          Substitutions Allowed     Route To [facility] #[id number]    Signed by Kelly Burgos CMA  10/26/2021 12:20:00 PM [facility]    ** Submitted: **  Complete:venlafaxine (Effexor XR 75 mg oral capsule, extended release)   Signed by Kelly Burgos CMA  10/26/2021 12:20:00 PM [facility]    ** Not Approved:  **  venlafaxine (VENLAFAXINE ER 75MG CAPSULES)  TAKE 1 CAPSULE BY MOUTH DAILY  Qty:  30 cap(s)        Days Supply:  30        Refills:  0          Substitutions Allowed     Route To [facility] #[id number]   Note from Pharmacy:  ZERO refills remain on this prescription. Your patient is requesting advance approval of refills for this medication to PREVENT ANY MISSED DOSES  Signed by Kelly Burgos CMA            ------------------------------------------  From: [facility] #[id number]  To: Raul Arango MD  Sent: October 23, 2021 8:08:36 AM CDT  Subject: Medication Management  Due: October 21, 2021 8:18:04 PM CDT     ** On Hold Pending Signature **     Dispensed Drug: venlafaxine (venlafaxine 75 mg oral capsule, extended release), TAKE 1 CAPSULE BY MOUTH DAILY  Quantity: 30 cap(s)  Days Supply: 30  Refills: 0  Substitutions Allowed  Notes from Pharmacy: ZERO refills remain on this prescription. Your patient is requesting advance approval of refills for this medication to PREVENT ANY MISSED DOSES  ---------------------------------------------------------------  From: Kelly Burgos CMA (eRx Pool ([id number]_[place] [facility]))   To: Appointment Pool ([id number]_[place]);     Sent: 10/26/2021 7:21:34 AM CDT  Subject: FW: Medication  Management   Due Date/Time: 10/26/2021 8:08:00 AM CDT     Please call pt to schedule new medication follow up w/ BRYNN    ---------------------  From: Kelly Burgos CMA   To: [facility] #[id number]    Sent: 10/26/2021 7:20:58 AM CDT  Subject: Medication Management     ** Submitted: **  Order:venlafaxine (venlafaxine 75 mg oral capsule, extended release)  1 cap(s)  Oral  daily  Qty:  30 cap(s)        Days Supply:  30        Refills:  0          Substitutions Allowed     Route To Pharmacy - [facility] #[id number]    Signed by Kelly Burgos CMA  10/26/2021 12:20:00 PM [facility]    ** Submitted: **  Complete:venlafaxine (Effexor XR 75 mg oral capsule, extended release)   Signed by Kelly Burgos CMA  10/26/2021 12:20:00 PM [facility]    ** Not Approved:  **  venlafaxine (VENLAFAXINE ER 75MG CAPSULES)  TAKE 1 CAPSULE BY MOUTH DAILY  Qty:  30 cap(s)        Days Supply:  30        Refills:  0          Substitutions Allowed     Route To Pharmacy - [facility] #[id number]   Note from Pharmacy:  ZERO refills remain on this prescription. Your patient is requesting advance approval of refills for this medication to PREVENT ANY MISSED DOSES  Signed by Kelly Burgos CMA

## 2022-02-16 NOTE — PROGRESS NOTES
Patient:   MEERA CONTRERAS            MRN: [id number]            FIN: [id number]               Age:   22 years     Sex:  Female     :  1995   Associated Diagnoses:   Low back pain   Author:   Jose Armando Eng MD      Visit Information      Date of Service: 2018 01:52 pm  Performing Location: [facility]  Encounter#: [id number]      Primary Care Provider (PCP):  Shira Hester    NPI# [id number]      Chief Complaint   2018 1:57 PM CDT    Keshia IUD issues IUD has been in for 2 years, cramping, bleeding after sexual intercourse, Patient stated that not even having intercourse. Intermittent Pain, not an everyday thing.      Interval History   The patient is seen for SERGIO Harden, in reference to pelvic cramping.  The patient is a 22-year-old female who has had Keshia IUD for the past two years.  She had quite long and heavy crampy menses prior to Keshia and then had some spotting for a while with Keshia rather than full menses and now generally has no menstrual like bleeding.  She has been troubled with some backache in the area of her upper sacrum since probably last October, which has been intermittent.  She noticed it mostly when she would get up and then she would have some of that discomfort when she had sex and the discomfort reminded her of the back cramping she would get with her periods.  She has had some bleeding with intercourse but the bleeding does not seem to correlate with back pain.  She has not had any unusual discharge.  She saw Shira Farr yesterday and is sent over for pelvic ultrasound and consultation regarding this back pain.  She was screened for STDs before coming and results are pending.  She has no pain with eating, bowel movements, or urination.  Urinalysis is normal.  The pain is not disabling but just concerns her periodically and she probably experiences it a couple of times per week.        Review of Systems   Review  of systems is negative  except as documented under interval history.      Health Status   Allergies:    Allergic Reactions (Selected)  Severity Not Documented  Clindamycin (Diarrhea)   Medications:  (Selected)   Prescriptions  Prescribed  BuSpar 10 mg oral tablet: 1 tab(s) ( 10 mg ), PO, TID, # 90 tab(s), 1 Refill(s), Type: Maintenance, Pharmacy: [id number], 1 tab(s) po tid  FLUoxetine 10 mg oral capsule: 3 cap(s) ( 30 mg ), po, daily, # 270 cap(s), 3 Refill(s), Type: Maintenance, Pharmacy: [id number], pt due now for med check follow up per [facility] 3 cap(s) po daily  albuterol CFC free 90 mcg/inh inhalation aerosol: 2 puff(s), inh, qid, Instructions: dispense with Valved Chamber please  use with spacer chamber, PRN: as needed for wheezing, # 18 gm, 0 Refill(s), Type: Maintenance, Pharmacy: [id number], 2 puff(s) inh qid,PRN:as needed for wheezing,I...  Documented Medications  Documented  Allegra: po, 0 Refill(s), Type: Maintenance  Flonase 50 mcg/inh nasal spray: 1 spray(s), nasal, prn, Type: Maintenance  Keshia 13.5 mg intrauteral device: 1 EA ( 13.5 mg ), intrauteral, once, Instructions: Lot [id number], inserted 2/9/16, 0 Refill(s), Type: Maintenance   Problem list:    All Problems  Obesity / SNOMED CT 4986567302 / Probable  Tobacco user / SNOMED CT 576264712 / Probable  Anxiety / SNOMED CT 0982195927 / Confirmed  Depressive disorder / SNOMED CT 8949921515 / Confirmed  Canceled: Viral URI with cough / SNOMED CT 86606374      Histories   Past Medical History:    No active or resolved past medical history items have been selected or recorded.   Family History:    No family history items have been selected or recorded.   Procedure history:    Extraction of wisdom tooth (059704454).   Social History:        Alcohol Assessment            Current, 1-2 times per week, 3 drinks/episode average.      Tobacco Assessment            Electronic Cigarettes      Employment and Education Assessment             Student                     Comments:                      11/21/2016 - Kit Ordaz CMA                     Senior at [employer]        Physical Examination   Vital Signs   5/16/2018 1:57 PM CDT Temperature Oral 97.8 DegF    Peripheral Pulse Rate 93 bpm    Pulse Site Brachial artery    HR Method Electronic    Systolic Blood Pressure 129 mmHg    Diastolic Blood Pressure 80 mmHg    Mean Arterial Pressure 96 mmHg    BP Site Right arm    BP Method Electronic      Gastrointestinal:       Abdomen: Abdomen is soft and nontender without masses..    Gynecologic:  External genitalia are normal.  Ultrasound is done..       Review / Management   Radiology results   Ultrasound, Vaginal probe ultrasound was performed.  The uterus is normal measuring 7 x 4 x 3 cm.  The endometrium is thin and the IUD is present in the normal position within the endometrial cavity.  Myometrium is normal.  There is no fluid in the uterus.  On examination, there was no tenderness whatsoever in moving of the uterus.  The ovaries looked normal bilaterally and there is no tenderness in trying to visualize the adnexa.  The adnexa are approximately 2.2 cm with no dominant follicles.  There is no abnormality noted on pelvic ultrasound.        Impression and Plan   Diagnosis     Low back pain (EJO68-IF M54.5).     Low back discomfort, which the patient relates to as being pelvic but may simply be musculoskeletal.  There are no pelvic findings to confirm any etiology for the discomfort..     Plan:  The patient was reassured.  She was told that the IUD was in proper position and that the ultrasound was normal.  She seemed happy with this and will return p.r.n. presuming that her STI screening is negative..    Patient Instructions:       Counseled: Patient, Regarding diagnosis, Regarding treatment, Verbalized understanding.

## 2022-02-16 NOTE — PROGRESS NOTES
Patient:   MEERA CONTRERAS            MRN: [id number]            FIN: [id number]               Age:   25 years     Sex:  Female     :  1995   Associated Diagnoses:   Encounter for screening for COVID-19; Acute pharyngitis   Author:   Luke ESCOTO, Davion FAYE      Visit Information      Date of Service: 2021 08:44 am  Performing Location: [facility]  Encounter#: [id number]      Primary Care Provider (PCP):  Miguelina Bush NPI# [id number]   Visit type:  Video Visit via [employer] or [employer].    Participants in room during visit:  _1   Location of patient:  _home  Location of provider:  _ (Clinic office   Video Start Time:  _902  Video End Time:   _908    Today's visit was conducted via video conference due to the COVID-19 pandemic.  The patient's consent to proceed with a video visit has been obtained and documented.      Chief Complaint   2021 8:53 AM CST     Verbal consent given for a video visit. Pt is c/o fever,sore throat,chills,headachem,body aches and fatigue that started yesterday.        History of Present Illness   Patient is a _25 year old _female who is being evaluated via a billable video visit.  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    Ear/Nose/Mouth/Throat:  Sore throat.    Respiratory:  Negative.    Gastrointestinal:  Negative.       Health Status   Allergies:    Allergic Reactions (Selected)  Severity Not Documented  Clindamycin (Diarrhea)   Medications:  (Selected)   Prescriptions  Prescribed  ARIPiprazole 5 mg oral tablet: = 1 tab(s) ( 5 mg ), Oral, daily, # 90 EA, 1 Refill(s), Type: Maintenance, Pharmacy: [facility] #[id number], 1 tab(s) Oral daily, 70.75, in,  10/09/20 8:27:00 CDT, Height Measured, 292, lb, 10/09/20 8:27:00 CDT, Weight Measured  FLUoxetine 10 mg oral capsule: = 3 cap(s), Oral, daily, # 270 cap(s), 1 Refill(s), ISIAH, Type: Maintenance, Pharmacy: [facility] #[id number], 3 cap(s) Oral daily, 70.75, in, 10/09/20 8:27:00 CDT, Height Measured, 292, lb, 10/09/20 8:27:00 CDT, Weight Measured  nicotine 14 mg/24 hr transdermal film, extended release: 1 patch(es), Topical, daily, Instructions: start and use prior to 7mg dose, # 14 patch(es), 0 Refill(s), Type: Maintenance, Pharmacy: [facility] #[id number], 1 patch(es) Topical daily,x14 day(s),Instr:start and use prior to 7mg dose, 70.75, in, 1...  nicotine 7 mg/24 hr transdermal film, extended release: 1 patch(es), Topical, daily, Instructions: after 14 days of 14 mg nicotine, # 14 patch(es), 0 Refill(s), Type: Maintenance, Pharmacy: [facility] #[id number], 1 patch(es) Topical daily,x14 day(s),Instr:after 14 days of 14 mg nicotine, 70.75, in, 1...  Documented Medications  Documented  Allegra: po, 0 Refill(s), Type: Maintenance  Flonase 50 mcg/inh nasal spray: 1 spray(s), nasal, prn, Type: Maintenance  Kyleena 19.5 mg intrauterine device: = 1 EA ( 19.5 mg ), Intrauteral, once, Instructions: inserted 2/22/19, 0 Refill(s), Type: Maintenance   Problem list:    All Problems  Tobacco user / SNOMED CT 220311345 / Probable  Obesity / SNOMED CT 2399053263 / Probable  Anxiety / SNOMED CT 4048103905 / Confirmed  Depressive disorder / SNOMED CT 4744277827 / Confirmed      Histories   Past Medical History:    No active or resolved past medical history items have been selected or recorded.   Family History:    No family history items have been selected or recorded.   Procedure history:    Encounter for insertion of intrauterine contraceptive device (Z30.430) on 2/22/2019 at 23 Years.  Comments:  2/24/2019 8:02 AM ATIF - Jennifer Warren LPN IUD insertion  Lot# [id number]  Exp. 01/2021    Due for removal  2/22/24  Extraction of wisdom tooth ([id number]).   Social History:        Electronic Cigarette/Vaping Assessment            Electronic Cigarette Use: vapes.  1/2 cartridge/day Use Per Day.      Alcohol Assessment            Current, 1 TIME, 4 drinks/episode average.  5 drinks/episode maximum.      Tobacco Assessment            Former smoker, quit more than 30 days ago      Employment and Education Assessment            Student                     Comments:                      11/21/2016 - Kit Ordaz CMA at [place]        Physical Examination   Measurements from flowsheet : Measurements   2/1/2021 8:53 AM CST     Height Measured - Standard                70.75 in     General:  No acute distress.    Respiratory:  Respirations are non-labored.    Psychiatric:  Cooperative, Appropriate mood & affect, Normal judgment.       Impression and Plan   Diagnosis     Encounter for screening for COVID-19 (LMH88-DU Z11.52).     Acute pharyngitis (DDH60-WY J02.9).     Summary:  Patient is referred for [facility] COVID-19 testing and is instructed of the following:  Patient should remain isolated until results of test return and given that tests are not 100% accurate, would be safest to assume that they are contagious with COVID-19 until their symptoms have fully resolved. Isolation is recommended for at least 7 days from the onset of symptoms and for 3 days after resolution of fevers and productive cough. This means patient should not go to work or any public areas. In addition, it is recommended at home that they separate themselves from other people and from animals as much as possible, including using a separate bathroom. If they do need to be around others, a facemask is recommended. Frequent hand hygiene and cleaning of high touch surfaces is also recommended.   Symptoms can last for several weeks. For patients with COVID-19, they can sometimes start to improve and then get worse again. If symptoms  worsen at any time, including significant shortness of breath, low oxygen levels, high fevers that cannot be controlled, or concerns for dehydration, they should seek medical care. If going to the ER, calling 911, or seeking care at the clinic, they are reminded to notify staff that they have been tested for COVID-19.  Patient also is informed that testing will be done in their car at a scheduled time. Test will be sent to an outside commercial lab and billed by that lab. [facility] cannot confirm to patient how billing will be handled by their insurance company.    Patient is also informed that testing for COVID-19 must be reported to the public health department along with contact information for the patient.   Patient information is given to scheduling staff to get patient scheduled for testing. Patient will receive further instructions from scheduling staff.  Patient is encouraged to call back at any time with questions or concerns.  .    Orders     Orders   Lab (Gen Lab  Reference Lab):  POC, GROUP A STREP* ([facility]) (Order): Specimen Type: Swab, Collection Date: 2/1/2021 9:09 AM CST.     Orders   Requests (Lab):  SARS-CoV-2 RNA (COVID-19), Qualitative NAAT (Request) (Order): Encounter for screening for COVID-19.     Orders   Charges (Evaluation and Management):  [id number] office o/p est low 20-29 min (Charge) (Order): Quantity: 1, Encounter for screening for COVID-19  Acute pharyngitis.

## 2022-02-16 NOTE — TELEPHONE ENCOUNTER
---------------------  From: Suzy Blas RN (Phone Messages Pool ([id number]_[facility]))   To: [facility] Message Pool ([id number]_[facility]);     Sent: 11/3/2021 9:09:37 AM CDT  Subject: Venlafaxine withdrawl       PCP:  YUSUF      Time of Call:  9:03am       Person Calling:  pt  Phone number:  [phone]    Note:   VM received from pt stating she is having severe withdrawal sxs from Venlafaxine. Pt asking if ok to take cold and flu medication for sxs. Stated she was not given any direction.    Please advise.    Last office visit and reason:  9/22/21 genital sore NCB---------------------  From: Jennifer Warren LPN ([facility] Message Pool ([id number]))   To: Miguelina Bush;     Sent: 11/3/2021 9:23:08 AM CDT  Subject: FW: Venlafaxine withdrawl---------------------  From: Miguelina Bush   To: [facility] Message Pool ([id number]_[facility]);     Sent: 11/3/2021 11:06:36 AM CDT  Subject: RE: Venlafaxine withdrawl     would advise she schedule with DR Arango who is following her for mental health concerns about the effexor taper, notes indicate he wanted to see her in follow up now.  The cold and flu meds will not help for the withdrawal symptoms. If she is taking the medication because she has a head cold, that is fine.1311 TCB.Pt returned call- pt had forgot that she had called YUSUF this morning and was confused why she was getting a call back.  Informed pt of message from Miguelina. Pt asked to be transferred to scheduling to see GJM.Noted.

## 2022-02-16 NOTE — PROGRESS NOTES
Patient:   MEERA CONTRERAS            MRN: [id number]            FIN: [id number]               Age:   26 years     Sex:  Female     :  1995   Associated Diagnoses:   Vaginal itching; Vaginal sore   Author:   Miguelina Bush      Chief Complaint   2021 1:52 PM CDT    thinks she has a vaginal yeast infection, took Diflucan yesterday, has a bump in the vaginal area that was bleeding, discharge and itchiness        History of Present Illness   she tends to get multiple yeast infections  has itchiness, some white dc and always blisters. The diflucan resolves the problems  this time it didn't work  last sexual activity (with oral sex) about 1-2 weeks ago, open painful sores started 3 days ago  she has KYleena IUD in place and loves it, minimal periods      Health Status   Allergies:    Allergic Reactions (Selected)  Severity Not Documented  Clindamycin (Diarrhea)   Medications:  (Selected)   Prescriptions  Prescribed  ARIPiprazole 5 mg oral tablet: = 1 tab(s) ( 5 mg ), Oral, daily, # 90 EA, 1 Refill(s), Type: Maintenance, Pharmacy: [facility] #[id number], 1 tab(s) Oral daily, 70.75, in, 10/09/20 8:27:00 CDT, Height Measured, 292, lb, 10/09/20 8:27:00 CDT, Weight Measured  Diflucan 150 mg oral tablet: = 1 tab(s) ( 150 mg ), PO, Once, # 1 tab(s), 0 Refill(s), Type: Soft Stop, Pharmacy: [facility] #[id number], 1 tab(s) Oral once, 70.75, in, 09/15/21 10:25:00 CDT, Height Measured, 299, lb, 21 11:32:00 CST, Weight Measured  Effexor XR 75 mg oral capsule, extended release: = 1 cap(s) ( 75 mg ), Oral, daily, # 30 cap(s), 1 Refill(s), Type: Maintenance, Pharmacy: [facility] #[id number], 1 cap(s) Oral daily, 70.75, in, 09/15/21 10:25:00 CDT, Height Measured, 299, lb, 21 11:32:00 CST, Weight Measured  FLUoxetine 10 mg oral capsule: = 3 cap(s), Oral, daily, # 270 cap(s), 1 Refill(s), ISIAH, Type: Maintenance, Pharmacy: [facility] #[id number], 3 cap(s) Oral daily, 70.75, in, 10/09/20  8:27:00 CDT, Height Measured, 292, lb, 10/09/20 8:27:00 CDT, Weight Measured  Flonase 50 mcg/inh nasal spray: = 2 spray(s), nasal, daily, # 16 gm, 3 Refill(s), Type: Maintenance, [id number], 2 spray(s) Nasal daily, 70.75, in, 03/04/21 11:32:00 CST, Height Measured, 299, lb, 03/04/21 11:32:00 CST, Weight Measured  nicotine 14 mg/24 hr transdermal film, extended release: 1 patch(es), Topical, daily, Instructions: start and use prior to 7mg dose, # 14 patch(es), 0 Refill(s), Type: Maintenance, Pharmacy: [facility] #[id number], 1 patch(es) Topical daily,x14 day(s),Instr:start and use prior to 7mg dose, 70.75, in, 1...  nicotine 7 mg/24 hr transdermal film, extended release: 1 patch(es), Topical, daily, Instructions: after 14 days of 14 mg nicotine, # 14 patch(es), 0 Refill(s), Type: Maintenance, Pharmacy: [facility] #[id number], 1 patch(es) Topical daily,x14 day(s),Instr:after 14 days of 14 mg nicotine, 70.75, in, 1...  valACYclovir 1 g oral tablet: = 1 tab(s) ( 1 gm ), PO, q12 hrs, x 10 day(s), # 20 tab(s), 0 Refill(s), Type: Acute, Pharmacy: [facility] #[id number], 1 tab(s) Oral q12 hrs,x10 day(s), 70.75, in, 09/22/21 13:52:00 CDT, Height Measured, 299.9, lb, 09/22/21 13:52:00 CDT, Weight...  Documented Medications  Documented  Allegra: po, 0 Refill(s), Type: Maintenance  Kyleena 19.5 mg intrauterine device: = 1 EA ( 19.5 mg ), Intrauteral, once, Instructions: inserted 2/22/19, 0 Refill(s), Type: Maintenance   Problem list:    All Problems  Tobacco user / SNOMED CT 262919378 / Probable  Obesity / SNOMED CT 5389931067 / Probable  Depressive disorder / SNOMED CT 2882331834 / Confirmed  Anxiety / SNOMED CT 4118801100 / Confirmed  Canceled: Viral URI with cough / SNOMED CT 56768240      Histories   Past Medical History:    No active or resolved past medical history items have been selected or recorded.   Family History:    No family history items have been selected or recorded.    Procedure history:    Encounter for insertion of intrauterine contraceptive device (ICD-10-CM Z30.430) performed by Miguelina Bush on 2/22/2019 at 23 Years.  Comments:  2/24/2019 8:02 AM ATIF - Kelvin SHARIF, Jennifer Marcus IUD insertion  Lot# [id number]  Exp. 01/2021    Due for removal 2/22/24  Extraction of wisdom tooth (SNOMED CT 803899223).   Social History:        Electronic Cigarette/Vaping Assessment            Electronic Cigarette Use: vapes.  1/2 cartridge/day Use Per Day.      Alcohol Assessment            Current, 1 TIME, 4 drinks/episode average.  5 drinks/episode maximum.      Tobacco Assessment            Former smoker, quit more than 30 days ago      Employment and Education Assessment            Student                     Comments:                      11/21/2016 - Kit Ordaz CMA at [employer]        Physical Examination   Vital Signs   9/22/2021 1:52 PM CDT Temperature Tympanic 98.3 DegF    Peripheral Pulse Rate 104 bpm  HI    Systolic Blood Pressure 116 mmHg    Diastolic Blood Pressure 76 mmHg    Mean Arterial Pressure 89 mmHg    BP Site Right arm    BP Method Manual    Oxygen Saturation 97 %      Measurements from flowsheet : Measurements   9/22/2021 1:52 PM CDT Height Measured - Standard 70.75 in    Weight Measured - Standard 299.9 lb    BSA 2.6 m2    Body Mass Index 42.12 kg/m2  HI      General:  Alert and oriented.    Genitourinary:  No inguinal tenderness, No urethral discharge, there is an open bloody weeping lesion on labia left side, swabbed with HSV swab  vault with some bleeding from os like onset menses, blue string from closed os, no CMT no adnexal tenderness or masses.       Review / Management   Results review:  wet prep neg for clue/yeast/wbc/trich.       Impression and Plan   Diagnosis     Vaginal itching (YIU98-IE N89.8).     Vaginal sore (VEV16-NE N89.8).     Plan:  suspicious for HSV, will treat with valtrex and await labs  HSV education regarding  transmission/viral shedding.    Patient Instructions:       Counseled: Patient, Regarding diagnosis, Regarding treatment, Regarding medications, Verbalized understanding.    Orders     Orders (Selected)   Outpatient Orders  Ordered (In Transit)  Chlamydia/Neisseria gonorrhoeae RNA, TMA* ([facility]): Specimen Type: Swab, Collection Date: 09/22/21 14:29:00 CDT  Culture, Herpes Simplex Virus with Typing* ([facility]): Specimen Type: Vaginal, Collection Date: 09/22/21 14:29:00 CDT  Prescriptions  Prescribed  valACYclovir 1 g oral tablet: = 1 tab(s) ( 1 gm ), PO, q12 hrs, x 10 day(s), # 20 tab(s), 0 Refill(s), Type: Acute, Pharmacy: [facility] #[id number], 1 tab(s) Oral q12 hrs,x10 day(s), 70.75, in, 09/22/21 13:52:00 CDT, Height Measured, 299.9, lb, 09/22/21 13:52:00 CDT, Weight....

## 2022-02-16 NOTE — TELEPHONE ENCOUNTER
"---------------------  From: Moon Alves CMA (Phone Messages Pool ([id number]_[facility]))   To: [facility] Message Pool ([id number]);     Sent: 6/10/2021 9:24:03 AM CDT  Subject: yeast infection     Phone Message    PCP:   YUSUF      Time of Call:  0914       Person Calling:  pt  Phone number:  [phone]    Returned call at: _    Note:   Pt calling with concerns of yeast infection sx: itching, discomfort, discharge. Also mentioned labial \"bumps\" that she she gets with yeast infections - has been tested in past for STD/herpes all which came back negative. Sx started last night. States she has hx of yeast and Monistat has never helped. Would like rx for Diflucan if possible w/o appt d/t work schedule. No concerns of STD. Using [facility].     Last office visit and reason:  3/4/21 acute sinusitis ZIM---------------------  From: Jennifer Warren LPN ([facility] Message Pool ([id number]))   To: Miguelina Bush;     Sent: 6/10/2021 10:11:57 AM CDT  Subject: FW: yeast infection---------------------  From: Miguelina Bush   To: [facility] Message Pool ([id number]);     Sent: 6/10/2021 10:50:02 AM CDT  Subject: RE: yeast infection     I sent rx to her [id number] LM on personalized VM that rx has been sent to the pharmacy.  "

## 2022-02-16 NOTE — NURSING NOTE
Generalized Anxiety Disorder Screening Entered On:  9/21/2021 10:17 AM CDT    Performed On:  9/15/2021 10:16 AM CDT by Lavinia Acharya               CHACHO-7   CHACHO Nervous, Anxious On Edge :   Nearly every day   CHACHO Control Worrying B :   More than half the days   CHACHO Worrying Too Much :   More than half the days   CHACHO Trouble Relaxing :   Nearly every day   CHACHO Restless :   Several days   CHACHO Easily Annoyed/Irritable :   Nearly every day   CHACHO Afraid :   Several days   CHACHO Total Screening Score :   15    CHACHO Difficulty with Work, Home, Others :   Very difficult   Lavinia Acharya - 9/21/2021 10:16 AM CDT

## 2022-02-16 NOTE — PROGRESS NOTES
Patient:   MEERA CONTRERAS            MRN: [id number]            FIN: [id number]               Age:   23 years     Sex:  Female     :  1995   Associated Diagnoses:   Encounter for IUD insertion; Encounter for IUD removal   Author:   Miguelina Bush      Procedure   Pap smear procedure   Date/ Time:  2019 5:19:00 PM.     Confirmed: patient, procedure, safety procedures followed.     Performed by: Miguelina Bush.     Informed consent: signed by patient.     Indication: Would like To proceed with IUD removal of SHOSHANA and insertion of Shoshana or Kyleena. She wants to postpone prgenancy and does not want to have IUD remove/replaced any more frequently than needed.   Reviewed with pt today the risks and benefits of procedure. Risks include infection, uterine perforation, expulsion , embedment in the myometrium and discomfort, .  Common side effects include vaginal bleeding alterations, amenorrhea, intermenstrual bleeding and spotting, abdominal/pelvic discomfort.      Pregnancy is ruled out: 3 years was up  so will do UPT, it is negative, she is not sexually active right now    With the patient in the lithotomy position, her external genitalia is normal. A bimanual exam is performed to locate the position and size of the uterus and there are no pelvic contraindications.   A sterile speculum is used to enter the vaginal canal.  The os is visualized. THe IUD string is visualized and clamped with ring forceps and with slow steady tension, the IUD is easily removed.  Three sterile Betadine soaked gauze are used to clean the os thoroughly.  The tenaculum is slowly applied near the lip of the cervix  and gentle traction is applied to align the cervical canal with the uterine cavity.  A sterile uterine sound is inserted to check the patency of the cervix, measure the depth of the uterine cavity, confirm its direction and exclude the presence of any uterine anomaly.  The uterus sounded to __________7.5  cm_____.     The KYLEENA  IUD package was open,and the device is loaded into the insertion tube.   The tenaculum is grasped with one hand and with the other hand the insertion tube is gently advance through the cervical canal according to insertion guidelines.   The insertion tube was removed and threads were cut perpendicular with a sterile scissors, leaving about 3 cm visible outside the cervix.   Instruments removed.    The patient tolerated the procedure well and had no acute pain. She sat up and was monitored and given the the patient Follow-up Reminder Card  and was asked to make an appointment in 4-12 weeks to be re evaluated. Reminded regarding bleeding pattern expectations, need for condoms if not in a monogamous relationship and the need for yearly  exams.  .     Procedure tolerated: well.     Complications: none.        Impression and Plan   Diagnosis     Encounter for IUD insertion (AES73-DJ Z30.430).     Encounter for IUD removal (VYH51-NA Z30.432).
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
## 2022-02-16 NOTE — TELEPHONE ENCOUNTER
---------------------  From: Miguelina Bush   To: DYAN CONTRERAS    Sent: 3/3/2021 8:23:26 AM CST  Subject: General Message     We spoke on the phone this morning, Dyan. The test results are negative. The COVID test should be back by tomorrow. Glad you are feeling a bit better.    JULIANA Coombs      Results:  Date Result Name Value Ref Range   3/2/2021 2:40 PM Influenza A NEGATIVE (NEGATIVE - NEGATIVE)   3/2/2021 2:40 PM Influenza B NEGATIVE (NEGATIVE - NEGATIVE)   3/2/2021 2:40 PM Group A Strep POC NOT DETECTED (NOT DETECTED - )

## 2022-02-16 NOTE — TELEPHONE ENCOUNTER
---------------------  From: Addie Powell CMA   Sent: 9/26/2020 8:45:22 AM CDT  Subject: General Message     Patient notified of NEGATIVE covid result at 0845.

## 2022-02-16 NOTE — LETTER
(Inserted Image. Unable to display)     May 20, 2019      MEERA CONTRERAS  [address]  [place], [place] [id number]          Dear MEERA,      Thank you for selecting [facility] (previously [facility]) for your healthcare needs.    Our records indicate you are due for the following services:    Thyroid Ultrasound.      To schedule an appointment, please contact the referral department at [facility] who will assist you in scheduling your service. They can be reached at either of these numbers [phone] or [phone]. If you have other questions or need additional information, please feel free to contact your primary care clinic:     To schedule an appointment or if you have further questions, please contact your primary clinic:   Critical access hospital       [phone]   [facility]       [phone]              [facility]     [phone]      Powered by [employer]    Sincerely,    Healthcare Providers at [facility]

## 2022-02-16 NOTE — TELEPHONE ENCOUNTER
---------------------  From: Sonido MONTOYA, Nahomy WHITING (Phone Messages Pool (23624[facility]))   To: [facility] Message Pool ([address] [place] [address]);     Sent: 2/3/2021 5:32:14 PM CST  Subject: Work note     Phone message    PCP:   Message for [facility]      Time of Call:  1720       Person Calling:  Pt  Phone number:  [phone], [facility] to [facility]    Returned call at: 1726    Note:   Pt gave note to HR at work that says to quarantine until 2/8/2021. HR is telling her because it says her COVID test was negative and there are no reasons listed to quarantine, they will not accept the letter.    Pt is asking for ERNAG to write a new note or add to the one from today indicating why she needs to quarantine.    Last office visit and reason:  2/3/2021, pharyngitis---------------------  From: Kit Ordaz CMA ([facility] Message Pool ([facility]24[facility]))   To: Davion Butler PA-C;     Sent: 2/3/2021 5:39:19 PM CST  Subject: FW: Work note---------------------  From: Davion Butler PA-C   To: [facility] Message Pool ([id number]);     Sent: 2/3/2021 5:58:57 PM CST  Subject: RE: Work note     Revised work note to state that this recommendation is based on CDC guidelinesI called pt and informed her that new note is at  for pickup.  Kit Ordaz CMA

## 2022-02-16 NOTE — PROGRESS NOTES
Patient had recent negative Covid test and negative rapid strep (still awaiting culture)  she is taking ibuprofen, doing salt water rinses, using expectorant and decongestant but her   throat seems to be getting worse.  Will send in 5 day course of 40 mg prednisone to help with  the throat pain and inflammation, follow up if not improving

## 2022-02-16 NOTE — TELEPHONE ENCOUNTER
---------------------  From: Janelle Orellana (Phone Messages Pool (32224_[facility]))   Sent: 9/22/2021 11:13:50 AM CDT  Subject: General Message     Phone Message    PCP: YUSUF    Time of Call: 1108    Phone Number: [phone]    Returned call at: n/a    Note: Patient calls stating that she called yesterday and got a medication for a yeast infection. She states that when she gets a yeast infection, she gets bumps on her vaginal area. Patient states that today she has a very large bump on her labia and it is bleeding. Patient states that she is concerned as this has never happened before. she is wondering what she should do.    Advised in clinic visit. Patient agreed and was transferred to scheduling

## 2022-02-16 NOTE — TELEPHONE ENCOUNTER
Entered by Marie David on January 08, 2019 1:49:55 PM CST  Med Refill      Date of last office visit and reason:  11/9/18 for sore throat with TFS      Date of last Med Check / Px:   Anxiety check: 8/9/17  Px: none  Date of last labs pertaining to med:  n/a    RTC order in chart:  No current orders.     Patient has been seen multiple times for acute visits, but last anxiety check was over a year ago.  Protocal fill done last month.  I left message for patient to call back to discuss need for visit.      For Protocol refill, has patient been contacted:  yes.       ------------------------------------------  From: [facility] [id number]  To: Shira Hester  Sent: January 7, 2019 6:14:00 PM CST  Subject: Medication Management  Due: January 8, 2019 6:14:00 PM CST    ** On Hold Pending Signature **  Drug: FLUoxetine (FLUoxetine 10 mg oral capsule)  3 CAP(S) ORAL DAILY  Quantity: 90 cap(s)     Days Supply: 0         Refills: 0  Substitutions Allowed  Notes from Pharmacy: Needs appt for further refills    Dispensed Drug: FLUoxetine (FLUoxetine 10 mg oral capsule)  TAKE 3 CAPSULES BY MOUTH DAILY  Quantity: 90 cap(s)     Days Supply: 30        Refills: 0  Substitutions Allowed  Notes from Pharmacy:   ------------------------------------------Patient called back and set up appt. for Thursday with [facility].  30 day supply sent in for patient.  She is out as of today.  Patient understands she needs to keep appt. and be seen.

## 2022-02-16 NOTE — PROGRESS NOTES
Patient:   MEERA CONTRERAS            MRN: [id number]            FIN: [id number]               Age:   25 years     Sex:  Female     :  1995   Associated Diagnoses:   Anxiety; Depressive disorder; Tobacco user   Author:   Miguelina Bush      Visit Information      Date of Service: 10/09/2020 08:20 am  Performing Location: [facility]  Encounter#: [id number]      Chief Complaint   10/9/2020 8:27 AM CDT    est. care/ med refill        History of Present Illness     PHQ and CAGE scoring reviewed with pt  here to establish care with me  her Psychiatrist moved to [facility]  she has been on 30mg Fluoxetine and 5mg Abilify for probably a year, other higher doses SSRI did not feel good, she feels this is a good combination  doing counseling  works in counseling with the county  Also would like help to stop vaping  she smoked an 'average' amount cigs but then switched to vaping and 'can't even tell me' how many times a day she vapes, ceratinly over 20         Review of Systems   All other systems.     Health Status   Allergies:    Allergic Reactions (Selected)  Severity Not Documented  Clindamycin (Diarrhea)   Medications:  (Selected)   Prescriptions  Prescribed  ARIPiprazole 5 mg oral tablet: = 1 tab(s) ( 5 mg ), Oral, daily, # 90 EA, 1 Refill(s), Type: Maintenance, Pharmacy: [facility] #[id number], 1 tab(s) Oral daily, 70.75, in, 10/09/20 8:27:00 CDT, Height Measured, 292, lb, 10/09/20 8:27:00 CDT, Weight Measured  FLUoxetine 10 mg oral capsule: = 3 cap(s), Oral, daily, # 270 cap(s), 1 Refill(s), ISIAH, Type: Maintenance, Pharmacy: [facility] #[id number], 3 cap(s) Oral daily, 70.75, in, 10/09/20 8:27:00 CDT, Height Measured, 292, lb, 10/09/20 8:27:00 CDT, Weight Measured  nicotine 14 mg/24 hr transdermal film, extended release: 1 patch(es), Topical, daily, Instructions: start and use prior to 7mg dose, # 14 patch(es), 0 Refill(s), Type: Maintenance, Pharmacy: [facility] #[id number], 1  patch(es) Topical daily,x14 day(s),Instr:start and use prior to 7mg dose, 70.75, in, 1...  nicotine 7 mg/24 hr transdermal film, extended release: 1 patch(es), Topical, daily, Instructions: after 14 days of 14 mg nicotine, # 14 patch(es), 0 Refill(s), Type: Maintenance, Pharmacy: [facility] #[id number], 1 patch(es) Topical daily,x14 day(s),Instr:after 14 days of 14 mg nicotine, 70.75, in, 1...  Documented Medications  Documented  Allegra: po, 0 Refill(s), Type: Maintenance  Flonase 50 mcg/inh nasal spray: 1 spray(s), nasal, prn, Type: Maintenance  Kyleena 19.5 mg intrauterine device: = 1 EA ( 19.5 mg ), Intrauteral, once, Instructions: inserted 2/22/19, 0 Refill(s), Type: Maintenance   Problem list:    All Problems  Anxiety / SNOMED CT 5226004801 / Confirmed  Depressive disorder / SNOMED CT 0431862202 / Confirmed  Obesity / SNOMED CT 3590746695 / Probable  Tobacco user / SNOMED CT 620127852 / Probable  Canceled: Viral URI with cough / SNOMED CT 75223583      Histories   Past Medical History:    No active or resolved past medical history items have been selected or recorded.   Family History:    No family history items have been selected or recorded.   Procedure history:    Encounter for insertion of intrauterine contraceptive device (ICD-10-CM Z30.430) performed by Miguelina Bush on 2/22/2019 at 23 Years.  Comments:  2/24/2019 8:02 AM Jennifer Bourne LPN IUD insertion  Lot# [id number]  Exp. 01/2021    Due for removal 2/22/24  Extraction of wisdom tooth (SNOMED CT 330250660).   Social History:        Alcohol Assessment            Current, 1 TIME, 4 drinks/episode average.  5 drinks/episode maximum.      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 [employer]        Physical Examination   Vital Signs   10/9/2020 8:27 AM CDT  Temperature Tympanic 97.5 DegF  LOW    Peripheral Pulse Rate 80 bpm    Pulse Site Radial artery    HR Method Manual    Systolic Blood Pressure 110 mmHg    Diastolic Blood Pressure 78 mmHg    Mean Arterial Pressure 89 mmHg    BP Site Right arm    BP Method Manual      Measurements from flowsheet : Measurements   10/9/2020 8:27 AM CDT Height Measured - Standard 70.75 in    Weight Measured - Standard 292.0 lb    BSA 2.57 m2    Body Mass Index 41.01 kg/m2  HI      General:  Alert and oriented, No acute distress, good eye contact, engaging with conversation.    Neck:  Supple, Non-tender, No lymphadenopathy.    Respiratory:  Lungs are clear to auscultation, Respirations are non-labored.    Cardiovascular:  Normal rate, Regular rhythm.    Integumentary:  Warm, Dry, Pink.    Psychiatric:  Cooperative, Appropriate mood & affect, Normal judgment, Non-suicidal.       Impression and Plan   Diagnosis     Anxiety (GJS50-LO F41.9).     Depressive disorder (RGU83-PW F32.9).     Tobacco user (UCR04-QD Z72.0).     Patient Instructions:  Lifestyle risk factors.         Limitations: Alcohol consumption.         Counseled: Patient, Regarding diagnosis, Regarding treatment, Regarding medications, Diet, Activity, Verbalized understanding, offered to increase SSRI but she declines  Assessed  pt's desire to quit vaping  Advised on the benefits of cessation and it's role in preventing respiratory infections, cancers, heart disease and other health problems.  Offered help with medications to promote cessation, will start nicotine pathc. MUST NOT VAPE while using the patch, use over 1 month, set quite date Given the [place] quit line number.   15 min with pt, all in counseling.    Orders     Orders (Selected)   Prescriptions  Prescribed  ARIPiprazole 5 mg oral tablet: = 1 tab(s) ( 5 mg ), Oral, daily, # 90 EA, 1 Refill(s), Type: Maintenance, Pharmacy: [facility] #[id number], 1 tab(s) Oral daily, 70.75, in, 10/09/20 8:27:00 CDT, Height  Measured, 292, lb, 10/09/20 8:27:00 CDT, Weight Measured  FLUoxetine 10 mg oral capsule: = 3 cap(s), Oral, daily, # 270 cap(s), 1 Refill(s), ISIAH, Type: Maintenance, Pharmacy: [facility] #[id number], 3 cap(s) Oral daily, 70.75, in, 10/09/20 8:27:00 CDT, Height Measured, 292, lb, 10/09/20 8:27:00 CDT, Weight Measured  nicotine 14 mg/24 hr transdermal film, extended release: 1 patch(es), Topical, daily, Instructions: start and use prior to 7mg dose, # 14 patch(es), 0 Refill(s), Type: Maintenance, Pharmacy: [facility] #[id number], 1 patch(es) Topical daily,x14 day(s),Instr:start and use prior to 7mg dose, 70.75, in, 1...  nicotine 7 mg/24 hr transdermal film, extended release: 1 patch(es), Topical, daily, Instructions: after 14 days of 14 mg nicotine, # 14 patch(es), 0 Refill(s), Type: Maintenance, Pharmacy: [facility] #[id number], 1 patch(es) Topical daily,x14 day(s),Instr:after 14 days of 14 mg nicotine, 70.75, in, 1....

## 2022-02-16 NOTE — PROGRESS NOTES
Patient:   MEERA CONTRERAS            MRN: [id number]            FIN: [id number]               Age:   21 years     Sex:  Female     :  1995   Associated Diagnoses:   Anxiety   Author:   Shira Hester      Visit Information      Primary Care Provider (PCP):  Not recorded.      Chief Complaint   2017 10:56 AM CST   Pt here to discuss referral for psych. Anxiety - has gotten worse recently. Zoloft could be working better. Also has hx of Prozac - which did not help.      History of Present Illness   PPC to discuss anxiety and treatment  remains on zoloft 150mg, first panic attack occured last week  describes disassociation like symptoms which have been ongoign for past 60d  had been on prozac years before for 24 months and stopped taking it because she felt better (she stopped   started smoking again.  irene IUD  GAD7=16         Review of Systems   Constitutional:  Negative.    Ear/Nose/Mouth/Throat:  Negative.    Respiratory:  Negative.    Cardiovascular:  Negative.    Gastrointestinal:  Negative.    Hematology/Lymphatics:  Negative.    Immunologic:  Negative.    Musculoskeletal:  Negative.    Integumentary:  Negative.    Neurologic:  Negative.    Psychiatric:  Anxiety, Depression, No steve, Not suicidal, Not delusional, No hallucinations.       Health Status   Allergies:    Allergic Reactions (Selected)  Severity Not Documented  Clindamycin (Diarrhea)   Medications:  (Selected)   Prescriptions  Prescribed  albuterol CFC free 90 mcg/inh inhalation aerosol: 2 puff(s), inh, qid, Instructions: dispense with Valved Chamber please  use with spacer chamber, PRN: as needed for wheezing, # 18 gm, 0 Refill(s), Type: Maintenance, Pharmacy: [id number], 2 puff(s) inh qid,PRN:as needed for wheezing,I...  sertraline 100 mg oral tablet: See Instructions, Instructions: TAKE ONE AND ONE-HALF TABLETS BY MOUTH EVERY NIGHT AT BEDTIME, # 45 tab(s), Type: Soft Stop, Pharmacy: [id number],  TAKE ONE AND ONE-HALF TABLETS BY MOUTH EVERY NIGHT AT BEDTIME  sertraline 50 mg oral tablet: See Instructions, Instructions: 3 tabs at hs, # 90 tab(s), 0 Refill(s), Type: Maintenance, Pharmacy: [id number], PT IS DUE FOR FOLLOW UP, 3 tabs at hs  Documented Medications  Documented  Allegra: po, 0 Refill(s), Type: Maintenance  Flonase 50 mcg/inh nasal spray: 1 spray(s), nasal, prn, Type: Maintenance  Keshia 13.5 mg intrauteral device: 1 EA ( 13.5 mg ), intrauteral, once, Instructions: Lot [id number], inserted 2/9/16, 0 Refill(s), Type: Maintenance   Problem list:    All Problems  Obesity / SNOMED CT 7146774231 / Probable  Tobacco user / SNOMED CT 521036934 / Probable      Histories   Past Medical History:    No active or resolved past medical history items have been selected or recorded.   Family History:    No family history items have been selected or recorded.   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    Eye:  Pupils are equal, round and reactive to light.    HENT:  Normocephalic.    Neck:  Supple.    Musculoskeletal:  Normal range of motion.    Integumentary:  Warm, Dry, Pink.    Neurologic:  Alert, Oriented, Normal sensory, No focal deficits.    Psychiatric:  Cooperative, Appropriate mood & affect, Normal judgment, Non-suicidal.       Impression and Plan   Diagnosis     Anxiety (BBT03-GS F41.9).     Patient Instructions:          Limitations:  Alcohol consumption.         Counseled: Patient, Regarding diagnosis, Regarding treatment, Regarding medications, Verbalized understanding.    Summary:  does not  feel sertraline if beneficial,   will look at prozac again since it was beneficial   I will see her back in 4-6 weeks to see how she is feeling, she is under verbal contract not to cause self harm  she has requested a referral for counseling which has been provided for her but does not want counseling through [facility] because she has tried this in past and was not beneficial  discussed medication transition  I spent over 25 minutes with pt today with over 50% spent in counseling and education.    Orders     Orders (Selected)   Prescriptions  Prescribed  PROzac 10 mg oral capsule: See Instructions, Instructions: take one capsule for 3d then 20mg x3d then 30mg x3d, # 90 cap(s), 1 Refill(s), Type: Maintenance, Pharmacy: [id number], take one capsule for 3d then 20mg x3d then 30mg x3d.

## 2022-02-16 NOTE — NURSING NOTE
Comprehensive Intake Entered On:  10/9/2020 8:35 AM CDT    Performed On:  10/9/2020 8:27 AM CDT by Tara Casey               Summary   Chief Complaint :   est. care/ med refill   Menstrual Status :   Prophylaxis   Weight Measured :   292.0 lb(Converted to: 292 lb 0 oz, 132.449 kg)    Height Measured :   70.75 in(Converted to: 5 ft 11 in, 179.70 cm)    Body Mass Index :   41.01 kg/m2 (HI)    Body Surface Area :   2.57 m2   Systolic Blood Pressure :   110 mmHg   Diastolic Blood Pressure :   78 mmHg   Mean Arterial Pressure :   89 mmHg   Peripheral Pulse Rate :   80 bpm   BP Site :   Right arm   Pulse Site :   Radial artery   BP Method :   Manual   HR Method :   Manual   Temperature Tympanic :   97.5 DegF(Converted to: 36.4 DegC)  (LOW)    Race :      Languages :   English   Ethnicity :   Not  or    Tara Casey - 10/9/2020 8:27 AM CDT   Health Status   Allergies Verified? :   Yes   Medication History Verified? :   Yes   Immunizations Current :   No   Medical History Verified? :   Yes   Pre-Visit Planning Status :   Completed   Tobacco Use? :   Current every day smoker   Tobacco Cessation Review :   Not ready to quit   Tara Casey - 10/9/2020 8:27 AM CDT   Consents   Consent for Immunization Exchange :   Consent Granted   Consent for Immunizations to Providers :   Consent Granted   Tara Casey - 10/9/2020 8:27 AM CDT   Meds / Allergies   (As Of: 10/9/2020 8:35:22 AM CDT)   Allergies (Active)   clindamycin  Estimated Onset Date:   Unspecified ; Reactions:   diarrhea ; Created By:   Sophia Nuñez CMA; Reaction Status:   Active ; Category:   Drug ; Substance:   clindamycin ; Type:   Allergy ; Updated By:   Sophia Nuñez CMA; Reviewed Date:   10/9/2020 8:34 AM CDT        Medication List   (As Of: 10/9/2020 8:35:22 AM CDT)   Prescription/Discharge Order    FLUoxetine  :   FLUoxetine ; Status:   Prescribed ; Ordered As Mnemonic:   FLUoxetine 10 mg oral capsule ; Simple  Display Line:   3 cap(s), Oral, daily, 30 cap(s), 0 Refill(s) ; Ordering Provider:   Tracy Horton MD; Catalog Code:   FLUoxetine ; Order Dt/Tm:   8/7/2019 1:59:13 PM CDT            Home Meds    levonorgestrel  :   levonorgestrel ; Status:   Processing ; Ordered As Mnemonic:   Keshia 13.5 mg intrauteral device ; Action Display:   Complete ; Catalog Code:   levonorgestrel ; Order Dt/Tm:   10/9/2020 8:35:02 AM CDT          fluticasone nasal  :   fluticasone nasal ; Status:   Documented ; Ordered As Mnemonic:   Flonase 50 mcg/inh nasal spray ; Simple Display Line:   1 spray(s), nasal, prn ; Catalog Code:   fluticasone nasal ; Order Dt/Tm:   10/20/2015 1:18:23 PM CDT          fexofenadine  :   fexofenadine ; Status:   Documented ; Ordered As Mnemonic:   Allegra ; Simple Display Line:   po, 0 Refill(s) ; Catalog Code:   fexofenadine ; Order Dt/Tm:   5/25/2016 10:39:53 AM CDT          aripiprazole  :   aripiprazole ; Status:   Documented ; Ordered As Mnemonic:   ARIPiprazole 5 mg oral tablet ; Simple Display Line:   5 mg, 1 tab(s), Oral, daily, 0 Refill(s) ; Catalog Code:   aripiprazole ; Order Dt/Tm:   10/9/2020 8:32:05 AM CDT            ID Risk Screen   Recent Travel History :   No recent travel   Family Member Travel History :   No recent travel   Other Exposure to Infectious Disease :   Unknown   Tara Casey - 10/9/2020 8:27 AM CDT

## 2022-02-16 NOTE — PROGRESS NOTES
Patient:   MEERA CONTRERAS            MRN: [id number]            FIN: [id number]               Age:   22 years     Sex:  Female     :  1995   Associated Diagnoses:   Acute rhinosinusitis   Author:   Miguelina Bush      Visit Information      Date of Service: 02/15/2018 03:39 pm  Performing Location: [facility]  Encounter#: [id number]      Primary Care Provider (PCP):  Shira Hester    NPI# [id number]      Chief Complaint   2/15/2018 3:50 PM CST    Pt c/o having a cold one month ago-still having ongoing drainge, cough, chest pressure, throat irritation.        History of Present Illness             The patient presents with sinus problem.  The sinus problem is characterized by nasal congestion and headache.  The severity of the sinus problem is moderate.  The sinus problem is worsening.  The sinus problem has lasted for 4 week(s).  The context of the sinus problem: occurred in association with illness and started as a cold, no fever any longer, used lots of mucinex  has had pneumonia in the past.  Exacerbating factors consist of allergen exposure.  Relieving factors consist of antihistamine.  Associated symptoms consist of cough, yellow mucous with coughing and denies fever.        Review of Systems             Health Status   Allergies:    Allergic Reactions (Selected)  Severity Not Documented  Clindamycin (Diarrhea)   Medications:  (Selected)   Prescriptions  Prescribed  Augmentin 875 mg oral tablet: 1 tab(s), PO, q12hr, # 14 tab(s), 0 Refill(s), Type: Maintenance, Pharmacy: [facility] [id number], 1 tab(s) po q12 hrs,x7 day(s)  BuSpar 10 mg oral tablet: 1 tab(s) ( 10 mg ), PO, TID, # 90 tab(s), 1 Refill(s), Type: Maintenance, Pharmacy: [facility] [id number], 1 tab(s) po tid  Diflucan 150 mg oral tablet: 1 tab(s) ( 150 mg ), PO, Once, # 1 tab(s), 0 Refill(s), Type: Soft Stop, Pharmacy: [facility] [id number], 1 tab(s) po once  FLUoxetine 10 mg oral capsule: 3 cap(s) (  30 mg ), po, daily, # 270 cap(s), 3 Refill(s), Type: Maintenance, Pharmacy: [id number], pt due now for med check follow up per [facility] 3 cap(s) po daily  albuterol CFC free 90 mcg/inh inhalation aerosol: 2 puff(s), inh, qid, Instructions: dispense with Valved Chamber please  use with spacer chamber, PRN: as needed for wheezing, # 18 gm, 0 Refill(s), Type: Maintenance, Pharmacy: [facility], 2 puff(s) inh qid,PRN:as needed for wheezing,I...  Documented Medications  Documented  Allegra: po, 0 Refill(s), Type: Maintenance  Flonase 50 mcg/inh nasal spray: 1 spray(s), nasal, prn, Type: Maintenance  Keshia 13.5 mg intrauteral device: 1 EA ( 13.5 mg ), intrauteral, once, Instructions: Lot [id number], inserted 2/9/16, 0 Refill(s), Type: Maintenance   Problem list:    All Problems  Tobacco user / SNOMED CT 013211779 / Probable  Obesity / SNOMED CT 5118223223 / Probable  Anxiety / SNOMED CT 6353890012 / Confirmed  Depressive disorder / SNOMED CT 7110210281 / Confirmed  Canceled: Viral URI with cough / SNOMED CT 91297966      Histories   Past Medical History:    No active or resolved past medical history items have been selected or recorded.   Family History:    No family history items have been selected or recorded.   Procedure history:    Extraction of wisdom tooth (SNOMED CT 490568054).   Social History:        Alcohol Assessment            Current, 1-2 times per week, 3 drinks/episode average.      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    Eye:  Normal conjunctiva.    HENT:  Tympanic membranes are clear, Normal hearing, Oral mucosa is moist, No pharyngeal erythema.    Neck:  Non-tender, No lymphadenopathy.    Respiratory:  Lungs are clear to auscultation, Respirations are non-labored, Breath sounds are equal.    Cardiovascular:  Normal rate, Regular rhythm, No murmur.    Integumentary:  Warm, Dry, Pink, No rash.    Neurologic:  Alert, Oriented.    Psychiatric:  Cooperative, Appropriate mood & affect.       Impression and Plan   Diagnosis     Acute rhinosinusitis (ZZI06-TD J01.90).     Orders     Orders (Selected)   Prescriptions  Prescribed  Augmentin 875 mg oral tablet: 1 tab(s), PO, q12hr, # 14 tab(s), 0 Refill(s), Type: Maintenance, Pharmacy: [facility] [id number], 1 tab(s) po q12 hrs,x7 day(s)  Diflucan 150 mg oral tablet: 1 tab(s) ( 150 mg ), PO, Once, # 1 tab(s), 0 Refill(s), Type: Soft Stop, Pharmacy: [facility] [address], 1 tab(s) po once.     Use a mucolytic (like guaifenesin/Mucinex/Robitussin) and saline nasal spray, a decongestant (pseudoephedrine--kept behind the counter--if not contraindicated), return if not better.  If you were prescribed an antibiotic,  a probiotic at the pharmacy to help prevent diarrhea and yeast infections, antibiotics can cause this.      .

## 2022-02-16 NOTE — PROGRESS NOTES
Chief Complaint    Hx of tonsil stones, seasonal allergies  History of Present Illness      Interested in allergy shots. Allergies are increased this year. Has tried Flonase, Allegra, Claritin without significant benefit. Wakes up with sneezing uncontrollably, scratchy throat and eye discharge. [place] Mom gets allergy shots.       Gets tonsil stones intermittently  Review of Systems      No fevers       No vomiting  Physical Exam   Vitals & Measurements    T: 98.3   F (Tympanic)  HR: 80(Peripheral)  BP: 132/100  SpO2: 98%       General: No acute distress.      HENT: Tympanic membranes are clear, No pharyngeal erythema.  1 small tonsil stone seen on the right tonsil      Neck: No lymphadenopathy.      Respiratory: Lungs are clear to auscultation.      Cardiovascular: Normal rate, Regular rhythm.      Musculoskeletal: Normal gait.  Assessment/Plan   Allergic rhinitis: Discussed options and will give him 10 day course of prednisone 20 mg.  Will refer to allergist. Could consider an IM injection as well if takes a while to get into allergist.  Patient Information     Name:MEERA CONTRERAS      Address:      [address] [place] [address]      Sex:Female      YOB: 1995      [phone]      Emergency Contact:MITCH CONTRERAS     MRN:[id number]     FIN:[id number]     Location:[facility]     Date of Service:07/13/2018      Primary Care Physician:       Shira Hester, [phone]      Attending Physician:       Raul Arango MD, [phone]  Problem List/Past Medical History    Ongoing     Anxiety     Depressive disorder     Obesity     Tobacco user    Historical     No qualifying data  Procedure/Surgical History     Extraction of wisdom tooth  Medications        Flonase 50 mcg/inh nasal spray: 1 spray(s), nasal, prn.        Keshia 13.5 mg intrauteral device: 13.5 mg, 1 EA, intrauteral, once, Lot [id number], inserted 2/9/16, 0  Refill(s).        albuterol CFC free 90 mcg/inh inhalation aerosol: 2 puff(s), inh, qid, dispense with Valved Chamber please  use with spacer chamber, PRN: as needed for wheezing, 18 gm, 0 Refill(s).        Allegra: po, 0 Refill(s).        BuSpar 10 mg oral tablet: 10 mg, 1 tab(s), PO, TID, 90 tab(s), 1 Refill(s).        FLUoxetine 10 mg oral capsule: 30 mg, 3 cap(s), po, daily, 270 cap(s), 3 Refill(s).                Allergies    clindamycin (diarrhea)  Social History    Smoking Status - 07/13/2018     Former smoker     Alcohol      Current, 1-2 times per week, 3 drinks/episode average., 08/09/2017     Employment and Education      Student, 11/21/2016     Tobacco      Electronic Cigarettes, 02/15/2018  Immunizations      Vaccine Date Status Comments      influenza virus vaccine, inactivated 01/20/2014 Recorded      human papillomavirus vaccine 01/20/2014 Recorded WIR      human papillomavirus vaccine 10/29/2013 Recorded WIR      meningococcal conjugate vaccine 07/12/2012 Recorded WIR      influenza, H1N1, inactivated 12/03/2009 Recorded      varicella 04/22/2009 Recorded WIR      meningococcal conjugate vaccine 04/22/2009 Recorded WIR      tetanus/diphth/pertuss (Tdap) adult/adol 04/22/2009 Recorded WIR      DTaP 12/09/1999 Recorded WIR      Hib (PRP-T) 12/09/1999 Recorded WIR      MMR (measles/mumps/rubella) 12/09/1999 Recorded WIR      IPV 12/09/1999 Recorded WIR      varicella 12/10/1998 Recorded WIR      DTaP 11/19/1996 Recorded WIR      MMR (measles/mumps/rubella) 10/01/1996 Recorded WIR      hepatitis B pediatric vaccine 04/26/1996 Recorded WIR      DTaP 01/11/1996 Recorded WIR      Hib (PRP-T) 01/11/1996 Recorded WIR      IPV 01/11/1996 Recorded WIR      DTaP 1995 Recorded WIR      Hib (PRP-T) 1995 Recorded WIR      IPV 1995 Recorded WIR      hepatitis B pediatric vaccine 1995 Recorded WIR      DTaP 1995 Recorded WIR      hepatitis B pediatric vaccine 1995 Recorded WIR       Hib (PRP-T) 1995 Recorded WIR      IPV 1995 Recorded WIR  Lab Results          Lab Results (Last 4 results within 90 days)           TSH: 1.55 mIU/L [None  - 5] (05/14/18 19:48:00)          UA pH: 5.5 [5  - 8] (05/14/18 20:03:00)          UA Specific Gravity: 1.025 [1.001  - 1.035] (05/14/18 20:03:00)          UA Glucose: NEGATIVE [None  - Few] (05/14/18 20:03:00)          UA Bilirubin: NEGATIVE [None  - Few] (05/14/18 20:03:00)          UA Ketones: NEGATIVE [None  - Few] (05/14/18 20:03:00)          Urine Occult Blood: TRACE Abnormal [None  - Few] (05/14/18 20:03:00)          UA Protein: NEGATIVE [None  - Few] (05/14/18 20:03:00)          UA Nitrite: NEGATIVE [None  - Few] (05/14/18 20:03:00)          UA Leukocyte Esterase: NEGATIVE [None  - Few] (05/14/18 20:03:00)          UA Epithelial Cells: Moderate [None  - Few] (05/14/18 20:00:00)          UA WBC: 0-2 [None  - 5] (05/14/18 20:00:00)          UA RBC: 0-2 [None  - 2] (05/14/18 20:00:00)          UA Bacteria: Moderate [None  - Few] (05/14/18 20:00:00)          Chlam/N. gonorrhea Comments: See comment [None  - Few] (05/14/18 19:48:00)          HIV Ag/Ab: NON-REACTIVE [None  - Few] (05/14/18 19:48:00)          RPR Ql: NON-REACTIVE [None  - Few] (05/14/18 19:48:00)          Chlamydia RNA: NOT DETECTED [None  - Few] (05/14/18 19:48:00)          N. gonorrhea RNA: NOT DETECTED [None  - Few] (05/14/18 19:48:00)          Culture Urine: See comment [1.001  - 1.035] (05/14/18 20:13:00)          Wet Prep Yeast: None Seen [None  - Few] (05/14/18 20:00:00)          Wet Prep Trichomonas: None Seen [None  - Few] (05/14/18 20:00:00)          Wet Prep Clue Cells: None Seen [None  - Few] (05/14/18 20:00:00)

## 2022-02-16 NOTE — NURSING NOTE
Depression Screening Entered On:  1/10/2019 3:15 PM CST    Performed On:  1/10/2019 3:14 PM CST by Leatha Means               Depression Screening   Feeling Down, Depressed, Hopeless :   More than half the days   Little Interest - Pleasure in Activities :   Several days   Initial Depression Screen Score :   3    Trouble Falling or Staying Asleep :   Several days   Feeling Tired or Little Energy :   Nearly every day   Poor Appetite or Overeating :   Several days   Feeling Bad About Yourself :   Several days   Trouble Concentrating :   More than half the days   Moving or Speaking Slowly :   Not at all   Thoughts Better Off Dead or Hurting Self :   Several days   Detailed Depression Screen Score :   9    Total Depression Screen Score :   12    CHACHO Difficulty with Work, Home, Others :   Somewhat difficult   eLatha Means - 1/10/2019 3:14 PM CST

## 2022-02-16 NOTE — PROGRESS NOTES
Patient:   MEERA CONTRERAS            MRN: [id number]            FIN: [id number]               Age:   25 years     Sex:  Female     :  1995   Associated Diagnoses:   Anal fissure   Author:   Miguelina Bush      Visit Information      Date of Service: 2020 05:33 pm  Performing Location: [facility]  Encounter#: [id number]      Primary Care Provider (PCP):  Miguelina Bush    NPI# [id number]      Referring Provider:  Miguelina Bush    NPI# [id number]      Chief Complaint   2020 5:34 PM CST   blood in stool, bright red, feels that it was bleeding a lot, started today, had 3 bowel movements, had a harder time with the bowel movements, denies any rectal pain        History of Present Illness   bowel movement 8 hours ago with bright red blood in stool and on paper. Was hard to pass. Had two other bm's today with bleeding. Some pain with passing stool. Has never had this before. no abd pain, no vaginal bleeding      Health Status   Allergies:    Allergic Reactions (Selected)  Severity Not Documented  Clindamycin (Diarrhea)   Medications:  (Selected)   Prescriptions  Prescribed  ARIPiprazole 5 mg oral tablet: = 1 tab(s) ( 5 mg ), Oral, daily, # 90 EA, 1 Refill(s), Type: Maintenance, Pharmacy: [facility] #[id number], 1 tab(s) Oral daily, 70.75, in, 10/09/20 8:27:00 CDT, Height Measured, 292, lb, 10/09/20 8:27:00 CDT, Weight Measured  FLUoxetine 10 mg oral capsule: = 3 cap(s), Oral, daily, # 270 cap(s), 1 Refill(s), ISIAH, Type: Maintenance, Pharmacy: [facility] #[id number], 3 cap(s) Oral daily, 70.75, in, 10/09/20 8:27:00 CDT, Height Measured, 292, lb, 10/09/20 8:27:00 CDT, Weight Measured  nicotine 14 mg/24 hr transdermal film, extended release: 1 patch(es), Topical, daily, Instructions: start and use prior to 7mg dose, # 14 patch(es), 0 Refill(s), Type: Maintenance, Pharmacy: [facility] #[id number], 1 patch(es) Topical daily,x14 day(s),Instr:start and use prior to  7mg dose, 70.75, in, 1...  nicotine 7 mg/24 hr transdermal film, extended release: 1 patch(es), Topical, daily, Instructions: after 14 days of 14 mg nicotine, # 14 patch(es), 0 Refill(s), Type: Maintenance, Pharmacy: [facility] #[id number], 1 patch(es) Topical daily,x14 day(s),Instr:after 14 days of 14 mg nicotine, 70.75, in, 1...  Documented Medications  Documented  Allegra: po, 0 Refill(s), Type: Maintenance  Flonase 50 mcg/inh nasal spray: 1 spray(s), nasal, prn, Type: Maintenance  Kyleena 19.5 mg intrauterine device: = 1 EA ( 19.5 mg ), Intrauteral, once, Instructions: inserted 2/22/19, 0 Refill(s), Type: Maintenance,    Medications          *denotes recorded medication          FLUoxetine 10 mg oral capsule: 3 cap(s), Oral, daily, 270 cap(s), 1 Refill(s).          ARIPiprazole 5 mg oral tablet: 5 mg, 1 tab(s), Oral, daily, 90 EA, 1 Refill(s).          *Allegra: po, 0 Refill(s).          *Flonase 50 mcg/inh nasal spray: 1 spray(s), nasal, prn.          *Kyleena 19.5 mg intrauterine device: 19.5 mg, 1 EA, Intrauteral, once, inserted 2/22/19, 0 Refill(s).          nicotine 14 mg/24 hr transdermal film, extended release: 1 patch(es), Topical, daily, for 14 day(s), start and use prior to 7mg dose, 14 patch(es), 0 Refill(s).          nicotine 7 mg/24 hr transdermal film, extended release: 1 patch(es), Topical, daily, for 14 day(s), after 14 days of 14 mg nicotine, 14 patch(es), 0 Refill(s).       Problem list:    All Problems  Anxiety / SNOMED CT 1254750378 / Confirmed  Depressive disorder / SNOMED CT 0748701500 / Confirmed  Obesity / SNOMED CT 1419716532 / Probable  Tobacco user / SNOMED CT 823990555 / Probable  Canceled: Viral URI with cough / SNOMED CT 24356411      Histories   Past Medical History:    No active or resolved past medical history items have been selected or recorded.   Family History:    No family history items have been selected or recorded.   Procedure history:    Encounter for insertion of  intrauterine contraceptive device (ICD-10-CM Z30.430) performed by Miguelina Bush on 2/22/2019 at 23 Years.  Comments:  2/24/2019 8:02 AM ATIF - Kelvin SHARIF, Jennifer Marcus IUD insertion  Lot# [id number]  Exp. 01/2021    Due for removal 2/22/24  Extraction of wisdom tooth (SNOMED CT 198651729).   Social History:        Electronic Cigarette/Vaping Assessment            Electronic Cigarette Use: vapes.  1/2 cartridge/day Use Per Day.      Alcohol Assessment            Current, 1 TIME, 4 drinks/episode average.  5 drinks/episode maximum.      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       Impression and Plan   Diagnosis     Anal fissure ([id number] K60.2).     Plan:  stool softener  mineral oral 2 T po daily  miralax to promote loose stools.    Patient Instructions:       Counseled: Patient, Regarding diagnosis, Regarding treatment, Regarding medications, Verbalized understanding, Counseled on symptomatic management. Return to clinic for re evaluation if worsening, simply not improving, or failure to resolve.   .

## 2022-02-16 NOTE — TELEPHONE ENCOUNTER
---------------------  From: Leroy Matias   To: Luke ESCOTO, Davion FAYE;     Sent: 2/3/2021 10:24:50 AM CST  Subject: Covid results     Called pt about her negative COVID results. Pt states that her throat has gotten worse and was wondering if she could get something to help with the pain. Told pt that I would let DW know and that hopefully the strep culture will also be back today. Pt had no other questions or concerns at this time.

## 2022-02-16 NOTE — PROGRESS NOTES
Patient:   MEERA CONTRERAS            MRN: [id number]            FIN: [id number]               Age:   25 years     Sex:  Female     :  1995   Associated Diagnoses:   Suspected COVID-19 virus infection   Author:   Davion Eduardo MD      Impression and Plan   Diagnosis     Suspected COVID-19 virus infection (ARZ57-KH R68.89).     Course:  Improving.    Orders     Orders   Charges (Evaluation and Management):  42532 office outpatient visit 15 minutes (Charge) (Order): Quantity: 1, Suspected COVID-19 virus infection.     Orders (Selected)   Outpatient Orders  Ordered (Dispatched)  SARS-CoV-2 RNA (COVID-19), Qualitative NAAT* ([facility]): Specimen Type: Nasopharyngeal Swab, Collection Date: 20 14:55:00 CDT.        Visit Information      Date of Service: 2020 02:40 pm  Performing Location: [facility]  Encounter#: [id number]      Primary Care Provider (PCP):  Shira Hester    NPI# [id number]      Referring Provider:  Davion Eduardo MD    NPI# [id number]   Visit type:  Video Visit via [facility].    Participants in room during visit:  _Patient only   Accompanied by:  No one.    Source of history:  Self.    Location of patient:  _home  Location of provider:  _ Clinic  Video Start Time:  _1443  Video End Time:   _1456    Today's visit was conducted via video conference due to the COVID-19 pandemic.  The patient's consent to proceed with a video visit has been obtained and documented.   Referral source:  Self.    History limitation:  None.       Chief Complaint   2020 2:45 PM CDT    consent for video visit fot STand fatigue        History of Present Illness   Patient is a _ year old _ who is being evaluated via a billable video visit.             The patient presents with This is a video visit conducted due to the coronavirus pandemic.  The patient is a 25-year-old female.  Patient developed a sore throat runny nose and generalized fatigue approximately 24 hours ago.  She denies fever,  chills, headache, myalgias, cough, shortness of breath, chest pain, and GI symptoms.  The patient would like a coronavirus test to make sure she does not spread the illness to her friends and coworkers.  She will quarantine herself until results are available.  We discussed symptomatic treatment of the symptoms.  If symptoms greatly worsen she will follow-up in the emergency room.    .        Review of Systems   Constitutional:  Negative.    Eye:  Negative.    Ear/Nose/Mouth/Throat:  Negative.    Respiratory:  Negative.    Cardiovascular:  Negative.    Gastrointestinal:  Negative.    Genitourinary:  Negative.    Immunologic:  Negative.    Musculoskeletal:  Negative.    Integumentary:  Negative.    Neurologic:  Negative.    Psychiatric:  Negative.       Health Status   Allergies:    Allergic Reactions (Selected)  Severity Not Documented  Clindamycin (Diarrhea)   Medications:  (Selected)   Prescriptions  Prescribed  FLUoxetine 10 mg oral capsule: = 3 cap(s), Oral, daily, # 30 cap(s), 0 Refill(s), ISIAH, Type: Maintenance, Pharmacy: [facility] #[id number], Pt needs to reestablish w/ HCP for further refills., 3 cap(s) Oral daily  Documented Medications  Documented  Allegra: po, 0 Refill(s), Type: Maintenance  Flonase 50 mcg/inh nasal spray: 1 spray(s), nasal, prn, Type: Maintenance  Keshia 13.5 mg intrauteral device: 1 EA ( 13.5 mg ), intrauteral, once, Instructions: Lot [id number], inserted 2/9/16, 0 Refill(s), Type: Maintenance,    Medications          *denotes recorded medication          FLUoxetine 10 mg oral capsule: 3 cap(s), Oral, daily, 30 cap(s), 0 Refill(s).          *Allegra: po, 0 Refill(s).          *Flonase 50 mcg/inh nasal spray: 1 spray(s), nasal, prn.          *Keshia 13.5 mg intrauteral device: 13.5 mg, 1 EA, intrauteral, once, Lot [id number], inserted 2/9/16, 0 Refill(s).       Problem list:    All Problems  Anxiety / SNOMED CT 3914248778 / Confirmed  Depressive disorder / SNOMED CT 0558513869 /  Confirmed  Obesity / SNOMED CT 5290611847 / Probable  Tobacco user / SNOMED CT 834108810 / Probable      Histories   Past Medical History:    No active or resolved past medical history items have been selected or recorded.   Family History:    No family history items have been selected or recorded.   Procedure history:    Encounter for insertion of intrauterine contraceptive device (ICD-10-CM Z30.430) performed by Miguelina Bush on 2/22/2019 at 23 Years.  Comments:  2/24/2019 8:02 AM ATIF - Jennifer Warren LPN IUD insertion  Lot# [id number]  Exp. 01/2021    Due for removal 2/22/24  Extraction of wisdom tooth (SNOMED CT 494451816).   Social History:        Alcohol Assessment            Current, 1 TIME, 4 drinks/episode average.  5 drinks/episode maximum.      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        Physical Examination   General:  Alert and oriented, No acute distress.    Eye:  Pupils are equal, round and reactive to light, Extraocular movements are intact, Normal conjunctiva.    Respiratory:  Respirations are non-labored.    Integumentary:  Warm, Dry, Pink.    Neurologic:  Normal motor function.    Psychiatric:  Cooperative, Appropriate mood & affect, Normal judgment, Non-suicidal.         Mood and affect: Calm.         Behavior: Relaxed.         Judgment: Able to make sensible decisions, Appropriate in social situations.         Thought process: Appropriate.       Health Maintenance      Recommendations     Pending (in the next year)        OverDue           Tetanus Vaccine due  04/22/19  and every 10  year(s)           HIV Screen (if sexually active) (Female) due  05/14/19  and every 1  year(s)           Syphilis Screen (if sexually active) (Female) due  05/14/19  and every 1  year(s)           Alcohol Misuse  Screen (Female) due  01/10/20  and every 1  year(s)           Depression Screen (Female) due  01/10/20  and every 1  year(s)           Body Mass Index Check (Female) due  04/12/20  and every 1  year(s)           High Blood Pressure Screen (Female) due  04/12/20  and every 1  year(s)           Obesity Screen and Counseling (Female) due  04/12/20  and every 1  year(s)        Due            Influenza Vaccine due  08/31/20  and every 1  year(s)           Cervical Cancer Screen (if sexually active) due  09/23/20  Variable frequency           Gonorrhea Screen (if sexually active) due  09/23/20  and every 1  year(s)           Intimate Partner Violence Screening due  09/23/20  and every 1  year(s)           STD Counseling (if sexually active) (Female) due  09/23/20  and every 1  year(s)           Type 2 Diabetes Mellitus Screen (Female) due  09/23/20  Variable frequency     Satisfied (in the past 1 year)     There are no satisfied recommendations within the defined date range

## 2022-02-16 NOTE — NURSING NOTE
Comprehensive Intake Entered On:  4/4/2019 10:50 AM CDT    Performed On:  4/4/2019 10:45 AM CDT by Kit Ordaz CMA               Summary   Chief Complaint :   Pt here for vomiting and diarrhea x that started yesterday.   Menstrual Status :   Prophylaxis   Weight Measured :   279 lb(Converted to: 279 lb 0 oz, 126.55 kg)    Height Measured :   70.75 in(Converted to: 5 ft 11 in, 179.70 cm)    Body Mass Index :   39.18 kg/m2 (HI)    Body Surface Area :   2.51 m2   Systolic Blood Pressure :   110 mmHg   Diastolic Blood Pressure :   80 mmHg   Mean Arterial Pressure :   90 mmHg   Peripheral Pulse Rate :   98 bpm   BP Site :   Right arm   Pulse Site :   Radial artery   Temperature Tympanic :   100 DegF(Converted to: 37.8 DegC)    Respiratory Rate :   16 br/min   Race :      Languages :   English   Ethnicity :   Not  or    Kit Ordaz CMA - 4/4/2019 10:45 AM CDT   Health Status   Allergies Verified? :   Yes   Medication History Verified? :   Yes   Immunizations Current :   No   Medical History Verified? :   Yes   Pre-Visit Planning Status :   Not completed   Tobacco Use? :   Current every day smoker   Kit Ordaz CMA - 4/4/2019 10:45 AM CDT   Meds / Allergies   (As Of: 4/4/2019 10:50:13 AM CDT)   Allergies (Active)   clindamycin  Estimated Onset Date:   Unspecified ; Reactions:   diarrhea ; Created By:   Sophia Nuñez CMA; Reaction Status:   Active ; Category:   Drug ; Substance:   clindamycin ; Type:   Allergy ; Updated By:   Sophia Nuñez CMA; Reviewed Date:   4/4/2019 10:48 AM CDT        Medication List   (As Of: 4/4/2019 10:50:13 AM CDT)   Prescription/Discharge Order    FLUoxetine  :   FLUoxetine ; Status:   Prescribed ; Ordered As Mnemonic:   FLUoxetine 10 mg oral capsule ; Simple Display Line:   3 cap(s), Oral, daily, 90 cap(s), 1 Refill(s) ; Ordering Provider:   Shira Hester; Catalog Code:   FLUoxetine ; Order Dt/Tm:   2/6/2019 2:01:24 PM          busPIRone  :   busPIRone ;  Status:   Prescribed ; Ordered As Mnemonic:   busPIRone 10 mg oral tablet ; Simple Display Line:   10 mg, 1 tab(s), PO, TID, 270 tab(s), 1 Refill(s) ; Ordering Provider:   Shira Hester; Catalog Code:   busPIRone ; Order Dt/Tm:   1/10/2019 8:52:59 AM          albuterol  :   albuterol ; Status:   Prescribed ; Ordered As Mnemonic:   albuterol CFC free 90 mcg/inh inhalation aerosol ; Simple Display Line:   2 puff(s), inh, qid, dispense with Valved Chamber please  use with spacer chamber, PRN: as needed for wheezing, 18 gm, 0 Refill(s) ; Ordering Provider:   Miguelina Bush; Catalog Code:   albuterol ; Order Dt/Tm:   3/22/2016 11:26:52 AM            Home Meds    fexofenadine  :   fexofenadine ; Status:   Documented ; Ordered As Mnemonic:   Allegra ; Simple Display Line:   po, 0 Refill(s) ; Catalog Code:   fexofenadine ; Order Dt/Tm:   5/25/2016 10:39:53 AM          levonorgestrel  :   levonorgestrel ; Status:   Documented ; Ordered As Mnemonic:   Keshia 13.5 mg intrauteral device ; Simple Display Line:   13.5 mg, 1 EA, intrauteral, once, Lot [id number], inserted 2/9/16, 0 Refill(s) ; Catalog Code:   levonorgestrel ; Order Dt/Tm:   2/9/2016 3:45:21 PM          fluticasone nasal  :   fluticasone nasal ; Status:   Documented ; Ordered As Mnemonic:   Flonase 50 mcg/inh nasal spray ; Simple Display Line:   1 spray(s), nasal, prn ; Catalog Code:   fluticasone nasal ; Order Dt/Tm:   10/20/2015 1:18:23 PM            Social History   Social History   (As Of: 4/4/2019 10:50:13 AM CDT)   Alcohol:        Current, 1 TIME, 4 drinks/episode average.  5 drinks/episode maximum.   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

## 2022-02-16 NOTE — NURSING NOTE
Comprehensive Intake Entered On:  3/4/2021 11:40 AM CST    Performed On:  3/4/2021 11:32 AM CST by Eusebio Duran LPN               Summary   Chief Complaint :   MONO-LIKE Sx, negative tests for strep, flu, and covid.   Menstrual Status :   Prophylaxis   Weight Measured :   299 lb(Converted to: 299 lb 0 oz, 135.624 kg)    Height Measured :   70.75 in(Converted to: 5 ft 11 in, 179.70 cm)    Body Mass Index :   41.99 kg/m2 (HI)    Body Surface Area :   2.6 m2   Systolic Blood Pressure :   126 mmHg   Diastolic Blood Pressure :   86 mmHg (HI)    Mean Arterial Pressure :   99 mmHg   Peripheral Pulse Rate :   98 bpm   BP Site :   Right arm   Pulse Site :   Radial artery   BP Method :   Manual   HR Method :   Manual   Temperature Tympanic :   98.4 DegF(Converted to: 36.9 DegC)    Oxygen Saturation :   97 %   Race :      Languages :   English   Ethnicity :   Not  or    Eusebio Duran LPN  3/4/2021 11:32 AM CST   Health Status   Immunizations Current :   No   Tobacco Use? :   Former smoker   Eusebio Duran LPN  3/4/2021 11:32 AM CST   Consents   Consent for Immunization Exchange :   Consent Granted   Consent for Immunizations to Providers :   Consent Granted   Eusebio Duran LPN 3/4/2021 11:32 AM CST   Meds / Allergies   (As Of: 3/4/2021 11:40:42 AM CST)   Allergies (Active)   clindamycin  Estimated Onset Date:   Unspecified ; Reactions:   diarrhea ; Created By:   Sophia Nuñez CMA; Reaction Status:   Active ; Category:   Drug ; Substance:   clindamycin ; Type:   Allergy ; Updated By:   Sophia Nuñez CMA; Reviewed Date:   2/1/2021 8:57 AM CST        Medication List   (As Of: 3/4/2021 11:40:42 AM CST)   Prescription/Discharge Order    aripiprazole  :   aripiprazole ; Status:   Prescribed ; Ordered As Mnemonic:   ARIPiprazole 5 mg oral tablet ; Simple Display Line:   5 mg, 1 tab(s), Oral, daily, 90 EA, 1 Refill(s) ; Ordering Provider:   Miguelina Bush; Catalog Code:   aripiprazole ;  Order Dt/Tm:   10/9/2020 8:54:14 AM CDT          FLUoxetine  :   FLUoxetine ; Status:   Prescribed ; Ordered As Mnemonic:   FLUoxetine 10 mg oral capsule ; Simple Display Line:   3 cap(s), Oral, daily, 270 cap(s), 1 Refill(s) ; Ordering Provider:   Miguelina Bush; Catalog Code:   FLUoxetine ; Order Dt/Tm:   10/9/2020 8:54:41 AM CDT          nicotine  :   nicotine ; Status:   Prescribed ; Ordered As Mnemonic:   nicotine 14 mg/24 hr transdermal film, extended release ; Simple Display Line:   1 patch(es), Topical, daily, for 14 day(s), start and use prior to 7mg dose, 14 patch(es), 0 Refill(s) ; Ordering Provider:   Miguelina Bush; Catalog Code:   nicotine ; Order Dt/Tm:   10/9/2020 8:53:14 AM CDT          nicotine  :   nicotine ; Status:   Prescribed ; Ordered As Mnemonic:   nicotine 7 mg/24 hr transdermal film, extended release ; Simple Display Line:   1 patch(es), Topical, daily, for 14 day(s), after 14 days of 14 mg nicotine, 14 patch(es), 0 Refill(s) ; Ordering Provider:   Miguelina Bush; Catalog Code:   nicotine ; Order Dt/Tm:   10/9/2020 8:53:20 AM CDT            Home Meds    fexofenadine  :   fexofenadine ; Status:   Documented ; Ordered As Mnemonic:   Allegra ; Simple Display Line:   po, 0 Refill(s) ; Catalog Code:   fexofenadine ; Order Dt/Tm:   5/25/2016 10:39:53 AM CDT          fluticasone nasal  :   fluticasone nasal ; Status:   Documented ; Ordered As Mnemonic:   Flonase 50 mcg/inh nasal spray ; Simple Display Line:   1 spray(s), nasal, prn ; Catalog Code:   fluticasone nasal ; Order Dt/Tm:   10/20/2015 1:18:23 PM CDT          levonorgestrel  :   levonorgestrel ; Status:   Documented ; Ordered As Mnemonic:   Kyleena 19.5 mg intrauterine device ; Simple Display Line:   19.5 mg, 1 EA, Intrauteral, once, inserted 2/22/19, 0 Refill(s) ; Catalog Code:   levonorgestrel ; Order Dt/Tm:   10/9/2020 8:35:49 AM CDT            ID Risk Screen   Recent Travel History :   No recent travel   Family Member  Travel History :   No recent travel   Other Exposure to Infectious Disease :   Unknown   COVID-19 Testing Status :   No positive COVID-19 test   Eusebio Duran LPN - 3/4/2021 11:32 AM CST   Social History   Social History   (As Of: 3/4/2021 11:40:43 AM CST)   Alcohol:        Current, 1 TIME, 4 drinks/episode average.  5 drinks/episode maximum.   (Last Updated: 1/10/2019 3:16:46 PM CST by Leatha Means)          Tobacco:        Former smoker, quit more than 30 days ago   (Last Updated: 12/17/2020 5:39:32 PM CST by Jennifer Warren LPN)          Electronic Cigarette/Vaping:        Electronic Cigarette Use: vapes.  1/2 cartridge/day Use Per Day.   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

## 2022-02-16 NOTE — PROGRESS NOTES
Patient:   MEERA CONTRERAS            MRN: [id number]            FIN: [id number]               Age:   22 years     Sex:  Female     :  1995   Associated Diagnoses:   Eustachian tube disorder   Author:   Shira Hester      Visit Information      Primary Care Provider (PCP):  Shira Hester    NPI# [id number]      Chief Complaint   3/1/2018 4:39 PM CST     f/u ROM and sinusitis. Also still has yeast infection. Feels more tired.        History of Present Illness   2/15/18 sinusitis augmentin  18 ROM doxycycline  here with on going congestion and ear fullness no pain in right ear any more  no cough, hx of flonase use but has not started it for this issue      Review of Systems   Constitutional:  Fatigue, No fever.    Eye:  Negative.    Ear/Nose/Mouth/Throat:  Nasal congestion, mild sore throat and ear fullness.         Ear pain: Bilateral, bilateral ear fullness not pain.    Respiratory:  Negative.    Cardiovascular:  Negative.    Gastrointestinal:  Negative.    Hematology/Lymphatics:  Negative.    Immunologic:  Negative.    Musculoskeletal:  Negative.    Integumentary:  Negative.    Neurologic:  Negative.    Psychiatric:  Negative.             Health Status   Allergies:    Allergic Reactions (Selected)  Severity Not Documented  Clindamycin (Diarrhea)   Medications:  (Selected)   Prescriptions  Prescribed  BuSpar 10 mg oral tablet: 1 tab(s) ( 10 mg ), PO, TID, # 90 tab(s), 1 Refill(s), Type: Maintenance, Pharmacy: [facility] [id number], 1 tab(s) po tid  Diflucan 150 mg oral tablet: 1 tab(s) ( 150 mg ), PO, Once, # 1 tab(s), 0 Refill(s), Type: Soft Stop, Pharmacy: [facility] [address], 1 tab(s) po once  Diflucan 150 mg oral tablet: See Instructions, Instructions: take one now and repeat in one week if symptoms not improved, # 2 tab(s), 0 Refill(s), Type: Soft Stop, Pharmacy: [facility] [address], take one now and repeat in one week if symptoms not improved  FLUoxetine 10 mg oral  capsule: 3 cap(s) ( 30 mg ), po, daily, # 270 cap(s), 3 Refill(s), Type: Maintenance, Pharmacy: [facility] [id number], pt due now for med check follow up per [facility] 3 cap(s) po daily  albuterol CFC free 90 mcg/inh inhalation aerosol: 2 puff(s), inh, qid, Instructions: dispense with Valved Chamber please  use with spacer chamber, PRN: as needed for wheezing, # 18 gm, 0 Refill(s), Type: Maintenance, Pharmacy: [facility] [id number], 2 puff(s) inh qid,PRN:as needed for wheezing,I...  predniSONE 20 mg oral tablet: 2 tab(s) ( 40 mg ), po, daily, # 10 tab(s), 0 Refill(s), Type: Maintenance, Pharmacy: [facility] [id number], 2 tab(s) po daily,x5 day(s)  Documented Medications  Documented  Allegra: po, 0 Refill(s), Type: Maintenance  Flonase 50 mcg/inh nasal spray: 1 spray(s), nasal, prn, Type: Maintenance  Keshia 13.5 mg intrauteral device: 1 EA ( 13.5 mg ), intrauteral, once, Instructions: Lot [id number], inserted 2/9/16, 0 Refill(s), Type: Maintenance      Histories   Family History:    No family history items have been selected or recorded.      Physical Examination   Vital Signs   3/1/2018 4:39 PM CST Temperature Tympanic 97.5 DegF  LOW    Peripheral Pulse Rate 84 bpm    Pulse Site Radial artery    HR Method Manual    Systolic Blood Pressure 124 mmHg    Diastolic Blood Pressure 84 mmHg  HI    Mean Arterial Pressure 97 mmHg    BP Site Right arm    BP Method Manual      Measurements from flowsheet : Measurements   3/1/2018 4:39 PM CST     Ht/Wt Measurement Refused by Patient?     Yes     General:  Alert and oriented, No acute distress.    Eye:  Pupils are equal, round and reactive to light.    HENT:  Normocephalic.         Head: normocephalic.         Ear: Both ears, Middle ear, Tympanic membrane ( Fluid in middle ear, bilaterally ).         Nose: Both nostrils, erythematous, clear discharge.         Sinus: Bilateral, Maxillary sinus, Tenderness, Discharge ( Yellow ), Transillumination ( Decreased glow ).          Mouth: Within normal limits.         Throat: Pharynx ( Erythematous, moderate amount clear post nasal discharge ).         Glands: Bilateral, anterior cervical chain, shotty bilaterally.    Neck:  Supple.    Respiratory:  Lungs are clear to auscultation.    Cardiovascular:  Normal rate, Regular rhythm.    Gastrointestinal:  Soft, Non-tender.    Integumentary:  Warm, Dry, Pink.    Neurologic:  Alert, Oriented, Normal sensory.    Psychiatric:  Cooperative, Appropriate mood & affect.       Impression and Plan   Diagnosis     Eustachian tube disorder ([id number]-OO H69.90).     Patient Instructions:       Counseled: Patient, Regarding diagnosis, Regarding treatment, Regarding medications, Verbalized understanding.    Summary:  start prednisone to open up eustachian tubes allow them to drain, take with food  diflcuan for hx of yeast infections  start flonase.    Orders     Orders (Selected)   Prescriptions  Prescribed  Diflucan 150 mg oral tablet: See Instructions, Instructions: take one now and repeat in one week if symptoms not improved, # 2 tab(s), 0 Refill(s), Type: Soft Stop, Pharmacy: [facility] [address], take one now and repeat in one week if symptoms not improved  predniSONE 20 mg oral tablet: 2 tab(s) ( 40 mg ), po, daily, # 10 tab(s), 0 Refill(s), Type: Maintenance, Pharmacy: [facility] [address], 2 tab(s) po daily,x5 day(s).

## 2022-02-16 NOTE — TELEPHONE ENCOUNTER
"---------------------  From: Moon Alves CMA (Phone Messages Pool ([id number]_[facility]))   Sent: 4/4/2019 8:54:14 AM CDT  Subject: vomiting/mucous diarrhea     Phone Message    PCP:   MELISSA - plans to transfer care to [facility]      Time of Call:  0841       Person Calling:  Pt  Phone number:  [phone]    Returned call at: _    Note:   Pt states yesterday she felt achy/gross. Then during the night she has had diarrhea that looks mucous/clear and vomiting 4-5x. No blood in stool. Stomach feels \"rolling\" and some cramping. Some stool incontinence. HA. No recent diet changes or recent travel. Advised appt based on stool description and concerns of dehydration since she can't keep anything down. Pt agreed and was transferred to scheduling.    Last office visit and reason:  2/22/19 IUD removal/insert with NCB  "

## 2022-02-16 NOTE — TELEPHONE ENCOUNTER
---------------------  From: Dayami Trevizo LPN (Phone Messages Pool ([id number]_[facility]))   To: Phone Messages Pool (32224_[place] [facility] [place]);     Sent: 9/23/2020 12:46:41 PM CDT  Subject: sore throat/fatigue     Phone Message    PCP:   MELISSA      Time of Call:  12:37am       Person Calling:  pt  Phone number:  [phone]    Returned call at: 12:43pm    Note:   Pt LM stating she would like to talk to someone about if she needs to get a covid test. Pt says she has a sore throat and fatigue. Pt wondering if she needs a test or if it could just be a cold.    Returned call and LM for call back.    Last office visit and reason:  4/12/19 sinusitis with BAMPt LM at 12:55am- returned call at 1:04pm.   Pt says she could feel a sore throat coming on all day yesterday but it really started hurting last night. Pt says she took some ibuprofen this morning and it helped with the swelling and pain a little. Pt says she is really tired.  Pt denies headache, body aches and fever.    Suggested a video visit to discuss covid and strep testing. Pt does not think it is strep because she has had strep before.  Pt agreed to make video visit to discuss symptoms/testing. Transferred pt to scheduling

## 2022-02-16 NOTE — PROGRESS NOTES
called pt and LVM with negative STD results  normal TSH and thyroid US results showing small nodule which will need to be re-imaged in one year

## 2022-02-16 NOTE — PROGRESS NOTES
Patient:   MEERA CONTRERAS            MRN: [id number]            FIN: [id number]               Age:   22 years     Sex:  Female     :  1995   Associated Diagnoses:   Otitis media   Author:   Shira Hester      Visit Information      Date of Service: 2018 03:00 pm  Performing Location: [facility]  Encounter#: [id number]      Primary Care Provider (PCP):  Shira Hester    NPI# [id number]      Referring Provider:  Shira Hester    NPI# [id number]      Chief Complaint   2018 3:05 PM CST    f/u sinsusitis from 2/15. Now bilateral ear pain.        History of Present Illness   PPC with right ear pain, was on augmentin from 2/15/18 through 18 and sinus pressure has improved but right ear pain started over past 48 hours  left ear is itchy      Review of Systems   Constitutional:  Negative.    Eye:  Negative.    Ear/Nose/Mouth/Throat:       Ear pain: Right.    Respiratory:  Negative.    Cardiovascular:  Negative.    Gastrointestinal:  Negative.    Hematology/Lymphatics:  Negative.    Endocrine:  Negative.    Immunologic:  Negative.    Musculoskeletal:  Negative.    Integumentary:  Negative.    Neurologic:  Negative.    Psychiatric:  Negative.             Health Status   Allergies:    Allergic Reactions (Selected)  Severity Not Documented  Clindamycin (Diarrhea)   Medications:  (Selected)   Prescriptions  Prescribed  Augmentin 875 mg oral tablet: 1 tab(s), PO, q12hr, # 14 tab(s), 0 Refill(s), Type: Maintenance, Pharmacy: [facility] [id number], 1 tab(s) po q12 hrs,x7 day(s)  BuSpar 10 mg oral tablet: 1 tab(s) ( 10 mg ), PO, TID, # 90 tab(s), 1 Refill(s), Type: Maintenance, Pharmacy: [facility] [address], 1 tab(s) po tid  Diflucan 150 mg oral tablet: 1 tab(s) ( 150 mg ), PO, Once, # 1 tab(s), 0 Refill(s), Type: Soft Stop, Pharmacy: [facility] [address], 1 tab(s) po once  Diflucan 150 mg oral tablet: 1 tab(s) ( 150 mg ), PO, Once, # 1 tab(s), 0 Refill(s),  Type: Soft Stop, Pharmacy: [facility] [address], 1 tab(s) po once  FLUoxetine 10 mg oral capsule: 3 cap(s) ( 30 mg ), po, daily, # 270 cap(s), 3 Refill(s), Type: Maintenance, Pharmacy: [facility] [address], pt due now for med check follow up per [facility] 3 cap(s) po daily  albuterol CFC free 90 mcg/inh inhalation aerosol: 2 puff(s), inh, qid, Instructions: dispense with Valved Chamber please  use with spacer chamber, PRN: as needed for wheezing, # 18 gm, 0 Refill(s), Type: Maintenance, Pharmacy: [facility] [address], 2 puff(s) inh qid,PRN:as needed for wheezing,I...  doxycycline monohydrate 100 mg oral capsule: 1 cap(s) ( 100 mg ), PO, bid, # 14 cap(s), 0 Refill(s), Type: Maintenance, Pharmacy: [facility] [address], 1 cap(s) po bid,x7 day(s)  Documented Medications  Documented  Allegra: po, 0 Refill(s), Type: Maintenance  Flonase 50 mcg/inh nasal spray: 1 spray(s), nasal, prn, Type: Maintenance  Keshia 13.5 mg intrauteral device: 1 EA ( 13.5 mg ), intrauteral, once, Instructions: Lot [id number], inserted 2/9/16, 0 Refill(s), Type: Maintenance   Problem list:    All Problems  Tobacco user / SNOMED CT 726188692 / Probable  Obesity / SNOMED CT 9244830311 / Probable  Depressive disorder / SNOMED CT 7168458703 / Confirmed  Anxiety / SNOMED CT 1043124882 / Confirmed      Histories   Past Medical History:    No active or resolved past medical history items have been selected or recorded.   Family History:    No family history items have been selected or recorded.   Procedure history:    Extraction of wisdom tooth ([id number]).   Social History:        Alcohol Assessment            Current, 1-2 times per week, 3 drinks/episode average.      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     Yes     General:  Alert and oriented, No acute distress.    Eye:  Pupils are equal, round and reactive to light.    HENT:  Normocephalic.         Head: normocephalic.         Ear: Right ear, Middle ear, Tympanic membrane ( Bulging, Erythematous, minimal fluid ).         Nose: Both nostrils, erythematous, clear discharge.         Sinus: Bilateral, Maxillary sinus, Tenderness, Discharge ( Clear ).         Mouth: Within normal limits.         Throat: Pharynx ( Erythematous, moderate amount clear post nasal discharge ).         Glands: Bilateral, anterior cervical chain, shotty bilaterally.    Neck:  Supple.    Respiratory:  Lungs are clear to auscultation.    Cardiovascular:  Normal rate, Regular rhythm.    Gastrointestinal:  Soft, Non-tender.    Integumentary:  Warm, Dry, Pink.    Neurologic:  Alert, Oriented, Normal sensory.    Psychiatric:  Cooperative, Appropriate mood & affect.       Health Maintenance      Recommendations     Pending (in the next year)        Due            Cervical Cancer Screen (if sexually active) due  02/23/18  and every 3  year(s)           Chlamydia Screen (if sexually active) due  02/23/18  and every 1  year(s)           Gonorrhea Screen (if sexually active) due  02/23/18  and every 1  year(s)           HIV Screen (if sexually active) (Female) due  02/23/18  and every 1  year(s)           STD Counseling (if sexually active) (Female) due  02/23/18  and every 1  year(s)           Syphilis Screen (if sexually active) (Female) due  02/23/18  and every 1  year(s)           Type 2 Diabetes Mellitus  Screen (Female) due  02/23/18  Variable frequency        Due In Future            Alcohol Misuse Screen (Female) not due until  08/09/18  and every 1  year(s)           Depression Screen (Female) not due until  08/09/18  and every 1  year(s)           Body Mass Index Check (Female) not due until  02/15/19  and every 1  year(s)           Obesity Screen and Counseling (Female) not due until  02/15/19  and every 1  year(s)     Satisfied (in the past 1 year)        Satisfied            Alcohol Misuse Screen (Female) on  08/09/17.           Body Mass Index Check (Female) on  02/15/18.           Body Mass Index Check (Female) on  08/09/17.           Depression Screen (Female) on  08/09/17.           Depression Screen (Female) on  08/09/17.           Depression Screen (Female) on  08/09/17.           High Blood Pressure Screen (Female) on  02/23/18.           High Blood Pressure Screen (Female) on  02/15/18.           High Blood Pressure Screen (Female) on  08/09/17.           High Blood Pressure Screen (Female) on  04/27/17.           High Blood Pressure Screen (Female) on  03/27/17.           Obesity Screen and Counseling (Female) on  02/15/18.           Obesity Screen and Counseling (Female) on  08/09/17.           Obesity Screen and Counseling (Female) on  04/27/17.           Obesity Screen and Counseling (Female) on  03/27/17.        Impression and Plan   Diagnosis     Otitis media ([id number] H66.90).     Plan:  will follow augmentin with doxycycline  I would like her to use probiotic or yogurt in the middle of the day  please use sudafed every 4-6 hour to help with congestion and improve eustachian tube drainage.    Orders     Orders (Selected)   Prescriptions  Prescribed  Diflucan 150 mg oral tablet: 1 tab(s) ( 150 mg ), PO, Once, # 1 tab(s), 0 Refill(s), Type: Soft Stop, Pharmacy: [facility] [id number], 1 tab(s) po once  doxycycline monohydrate 100 mg oral capsule: 1 cap(s) ( 100 mg ), PO, bid, # 14 cap(s), 0  Refill(s), Type: Maintenance, Pharmacy: [facility] [address], 1 cap(s) po bid,x7 day(s).

## 2022-02-16 NOTE — NURSING NOTE
Comprehensive Intake Entered On:  2/22/2019 4:20 PM CST    Performed On:  2/22/2019 4:19 PM CST by Jennifer Warren LPN               Summary   Chief Complaint :   here for IUD removal and replacement with new IUD   Menstrual Status :   Prophylaxis   Weight Measured :   285.8 lb(Converted to: 285 lb 13 oz, 129.64 kg)    Systolic Blood Pressure :   138 mmHg (HI)    Diastolic Blood Pressure :   88 mmHg (HI)    Mean Arterial Pressure :   105 mmHg   Peripheral Pulse Rate :   104 bpm (HI)    Temperature Tympanic :   98.5 DegF(Converted to: 36.9 DegC)    Oxygen Saturation :   97 %   Race :      Languages :   English   Ethnicity :   Not  or    Jennifer Warren LPN - 2/22/2019 4:19 PM CST   Health Status   Allergies Verified? :   Yes   Medication History Verified? :   Yes   Medical History Verified? :   No   Pre-Visit Planning Status :   Not completed   Jennifer Warren LPN - 2/22/2019 4:24 PM CST   Immunizations Current :   No   Jennifer Warren LPN - 2/22/2019 4:19 PM CST   Meds / Allergies   (As Of: 2/22/2019 4:26:10 PM CST)   Allergies (Active)   clindamycin  Estimated Onset Date:   Unspecified ; Reactions:   diarrhea ; Created By:   Sophia Nuñez CMA; Reaction Status:   Active ; Category:   Drug ; Substance:   clindamycin ; Type:   Allergy ; Updated By:   Sophia Nuñez CMA; Reviewed Date:   1/10/2019 8:46 AM CST        Medication List   (As Of: 2/22/2019 4:26:10 PM CST)   Prescription/Discharge Order    busPIRone  :   busPIRone ; Status:   Prescribed ; Ordered As Mnemonic:   busPIRone 10 mg oral tablet ; Simple Display Line:   10 mg, 1 tab(s), PO, TID, 270 tab(s), 1 Refill(s) ; Ordering Provider:   Shira Hester; Catalog Code:   busPIRone ; Order Dt/Tm:   1/10/2019 8:52:59 AM          FLUoxetine  :   FLUoxetine ; Status:   Prescribed ; Ordered As Mnemonic:   FLUoxetine 10 mg oral capsule ; Simple Display Line:   3 cap(s), Oral, daily, 90 cap(s), 1 Refill(s) ; Ordering Provider:    Shira Hester; Catalog Code:   FLUoxetine ; Order Dt/Tm:   2/6/2019 2:01:24 PM          albuterol  :   albuterol ; Status:   Prescribed ; Ordered As Mnemonic:   albuterol CFC free 90 mcg/inh inhalation aerosol ; Simple Display Line:   2 puff(s), inh, qid, dispense with Valved Chamber please  use with spacer chamber, PRN: as needed for wheezing, 18 gm, 0 Refill(s) ; Ordering Provider:   Miguelina Bush; Catalog Code:   albuterol ; Order Dt/Tm:   3/22/2016 11:26:52 AM            Home Meds    levonorgestrel  :   levonorgestrel ; Status:   Documented ; Ordered As Mnemonic:   Keshia 13.5 mg intrauteral device ; Simple Display Line:   13.5 mg, 1 EA, intrauteral, once, Lot [id number], inserted 2/9/16, 0 Refill(s) ; Catalog Code:   levonorgestrel ; Order Dt/Tm:   2/9/2016 3:45:21 PM          fluticasone nasal  :   fluticasone nasal ; Status:   Documented ; Ordered As Mnemonic:   Flonase 50 mcg/inh nasal spray ; Simple Display Line:   1 spray(s), nasal, prn ; Catalog Code:   fluticasone nasal ; Order Dt/Tm:   10/20/2015 1:18:23 PM          fexofenadine  :   fexofenadine ; Status:   Documented ; Ordered As Mnemonic:   Allegra ; Simple Display Line:   po, 0 Refill(s) ; Catalog Code:   fexofenadine ; Order Dt/Tm:   5/25/2016 10:39:53 AM

## 2022-02-16 NOTE — TELEPHONE ENCOUNTER
---------------------  From: Miguelina Bush   To: DYAN CONTRERAS    Sent: 2/25/2019 1:53:29 PM CST    These tests are negativeDyan. Thank you.    JULIANA Coombs    Results:  Date Result Name Value Ref Range   2/22/2019 5:02 PM Chlam/N. gonorrhea Comments See comment    2/22/2019 5:02 PM Chlamydia RNA NOT DETECTED (NOT DETECTED - )   2/22/2019 5:02 PM Neisseria gonorrhoeae RNA NOT DETECTED (NOT DETECTED - )

## 2022-02-16 NOTE — LETTER
(Inserted Image. Unable to display)     February 15, 2019      MEERA CONTRERAS  [address]  [place], [place] [id number]          Dear MEERA,      Thank you for selecting [facility] (previously [facility]) for your healthcare needs.      Our records indicate you are due for the following services:     Follow-up office visit.      To schedule an appointment or if you have further questions, please contact your primary clinic:   [facility]       [phone]   Critical access hospital       [phone]              [facility]     [phone]      Powered by [employer]    Sincerely,    Shira Farr, INDERJITCNP

## 2022-02-16 NOTE — NURSING NOTE
Depression Screening Entered On:  9/21/2021 10:16 AM CDT    Performed On:  9/15/2021 10:16 AM CDT by Lavinia Acharya               Depression Screening   Little Interest - Pleasure in Activities :   Nearly every day   Feeling Down, Depressed, Hopeless :   Nearly every day   Initial Depression Screen Score :   6 Score   Poor Appetite or Overeating :   Several days   Trouble Falling or Staying Asleep :   Several days   Feeling Tired or Little Energy :   More than half the days   Feeling Bad About Yourself :   Nearly every day   Trouble Concentrating :   Several days   Moving or Speaking Slowly :   Not at all   Thoughts Better Off Dead or Hurting Self :   Several days   Difficulty at Work, Home, Getting Along :   Very difficult   Detailed Depression Screen Score :   9    Total Depression Screen Score :   15    Lavinia Acharya - 9/21/2021 10:16 AM CDT

## 2022-02-16 NOTE — PROGRESS NOTES
Patient:   DYAN CONTRERAS            MRN: [id number]            FIN: [id number]               Age:   22 years     Sex:  Female     :  1995   Associated Diagnoses:   Abdominal pain; Enlarged thyroid; FH: thyroid disease; Irregular menses; Screening for STD (sexually transmitted disease)   Author:   Shira Hester      Visit Information      Date of Service: 2018 06:48 pm  Performing Location: [facility]  Encounter#: [id number]      Primary Care Provider (PCP):  Shira Hester    NPI# [id number]      Chief Complaint   2018 6:57 PM CDT    Pt here to discuss menstrual cycle. Had discussed with [facility] in past.      Well Adult History   PPC because she has had intermittent abdominal cramping for past several months  she finds this occurs more often after intercourse, does not happen only after orgasm  she has irene IUD inserted , partner can still feel strings  Dyan has never had pap smear, she is currently in monogamous lesbian relationship but has been with men in past  hx of PCOs, has spotted twice recently and this is not typical for her    fy has hx of thyroid disease      Review of Systems   Constitutional:  Negative.    Ear/Nose/Mouth/Throat:  Negative.    Respiratory:  Negative.    Cardiovascular:  Negative.    Gastrointestinal:  Negative.    Genitourinary:  Negative.    Gynecologic:  Negative except as documented in history of present illness.    Hematology/Lymphatics:  Negative.    Endocrine:  Negative except as documented in history of present illness.    Immunologic:  Negative.    Neurologic:  Negative.    Psychiatric:  Negative.       Health Status   Allergies:    Allergic Reactions (Selected)  Severity Not Documented  Clindamycin (Diarrhea)   Medications:  (Selected)   Prescriptions  Prescribed  BuSpar 10 mg oral tablet: 1 tab(s) ( 10 mg ), PO, TID, # 90 tab(s), 1 Refill(s), Type: Maintenance, Pharmacy: [facility], 1 tab(s) po tid  FLUoxetine  10 mg oral capsule: 3 cap(s) ( 30 mg ), po, daily, # 270 cap(s), 3 Refill(s), Type: Maintenance, Pharmacy: [facility], pt due now for med check follow up per [facility]., 3 cap(s) po daily  albuterol CFC free 90 mcg/inh inhalation aerosol: 2 puff(s), inh, qid, Instructions: dispense with Valved Chamber please  use with spacer chamber, PRN: as needed for wheezing, # 18 gm, 0 Refill(s), Type: Maintenance, Pharmacy: [facility], 2 puff(s) inh qid,PRN:as needed for wheezing,I...  Documented Medications  Documented  Allegra: po, 0 Refill(s), Type: Maintenance  Flonase 50 mcg/inh nasal spray: 1 spray(s), nasal, prn, Type: Maintenance  Keshia 13.5 mg intrauteral device: 1 EA ( 13.5 mg ), intrauteral, once, Instructions: Lot [id number], inserted 2/9/16, 0 Refill(s), Type: Maintenance   Problem list:    All Problems (Selected)  Anxiety / SNOMED CT 4388146114 / Confirmed  Depressive disorder / SNOMED CT 3780258339 / Confirmed  Obesity / SNOMED CT 6303914627 / Probable  Tobacco user / SNOMED CT 790042338 / Probable      Histories   Family History:    No family history items have been selected or recorded.   Procedure history:    Extraction of wisdom tooth ([id number]).   Social History:        Alcohol Assessment            Current, 1-2 times per week, 3 drinks/episode average.      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    HENT:  Normocephalic.    Neck:  boggy thyroid on left side.    Gastrointestinal:  Soft, Non-tender, Non-distended, Normal bowel sounds.    Genitourinary:  No costovertebral angle tenderness.         Groin/ inguinal region: Bilateral, Within normal limits.         Labia: Bilateral, Majora, Minora, Within normal limits.         Vagina: Discharge ( White ).         Cervix: Within normal limits, can visualize IUD strings.         Uterus: Within normal limits.         Ovaries: Bilateral, Within normal limits.         Adnexa: Bilateral, Within normal limits.    Musculoskeletal:  Normal range of motion.    Integumentary:  Warm, Dry, Pink.    Neurologic:  Alert, Oriented, Normal sensory.    Psychiatric:  Cooperative, Appropriate mood & affect, Normal judgment.       Review / Management   Results review:  Lab results   5/14/2018 8:03 PM CDT UA pH 5.5    UA Specific Gravity 1.025    UA Glucose NEGATIVE    UA Bilirubin NEGATIVE    UA Ketones NEGATIVE    Urine Occult Blood TRACE    UA Protein NEGATIVE    UA Nitrite NEGATIVE    UA Leukocyte Esterase NEGATIVE   5/14/2018 8:00 PM CDT UA Epithelial Cells Moderate    UA WBC 0-2    UA RBC 0-2    UA Bacteria Moderate    Wet Prep Yeast None Seen    Wet Prep Trichomonas None Seen    Wet Prep Clue Cells None Seen   .         Interpretation: g+c, hiv, rpr , urine culture and TSH pending.       Impression and Plan   Diagnosis     Abdominal pain (HYA21-BT R10.9).     Enlarged thyroid (PRW81-KS E04.9).     FH: thyroid disease (VOP21-MB Z83.49).     Irregular menses (ZFX95-YR N92.6).     Screening for STD (sexually transmitted disease) (SFR27-EM Z11.3).     Patient Instructions:       Counseled: Patient, Verbalized understanding.    Summary:  discussed labs with pt  I am concerned with her boggy thryoid which I have never noticed before, I would like to obtain TSH and ultrasound, pt  agrees    her concern for abdominal cramping maria teresa after intercourse is also concerning,   we will obtain US to asses ovaries and see if there is abnormality in uterus, I can visualize IUD strings so this is less concerning but I would like like to confirm IUD position    STD screening and pap for complete evaluation  I will f/u with pt after imaging results return.

## 2022-02-16 NOTE — TELEPHONE ENCOUNTER
---------------------  From: Ryan/Janelle SALMERON (Phone Messages Pool ([id number]_[facility]))   To: [facility] Message Pool ([id number]);     Sent: 8/3/2021 10:25:10 AM CDT  Subject: General Message     Phone Message    PCP: [email or web address]    Time of Call: 1021    Phone Number: [phone]    Returned call at: n/a    Note: Patient calls stating that she is needing Fluconazole refilled. Patient states that she has a mild yeast infection and OTC Monistat does not work for her. Patient states that she has had the same partner for a while and she states that they were not being careful when they switched from anal to vaginal sex. Patient states that she thinks that this is how she got the yeast infection as this happened once before.    Please advise    Pharmacy: [facility]     Last office visit and reason: 3/4/21 acute sinusitis     Transferred to: [facility]---------------------  From: Moon Alves CMA ([employer] Message Pool ([id number]))   To: Miguelina Bush;     Sent: 8/3/2021 10:35:28 AM CDT  Subject: FW: General Message---------------------  From: Miguelina Bush   To: [facility] Message Pool ([id number]);     Sent: 8/3/2021 12:46:47 PM CDT  Subject: RE: General Message     I sent rxPt notified. She will f/u in clinic if sx do not improve.

## 2022-02-16 NOTE — PROGRESS NOTES
Patient:   MEERA CONTRERAS            MRN: [id number]            FIN: [id number]               Age:   23 years     Sex:  Female     :  1995   Associated Diagnoses:   Gastroenteritis   Author:   Davion Butler PA-C      Chief Complaint   2019 10:45 AM CDT    Pt here for vomiting and diarrhea x that started yesterday.      History of Present Illness   Chief complaint and symptoms noted above and confirmed with patient   vomiting and diarrhea started yesterday,  mucous in stool  last emesis was this am and that was dry heaves, had 4-5 sessions of emesis overnight  no recent travel, no new foods  having diarrhea at least hourly  is now able to hold down small sips of fluid         Review of Systems   Constitutional:  Fever.    Ear/Nose/Mouth/Throat:  Nasal congestion, Sore throat.    Gastrointestinal:  Nausea, Vomiting, Diarrhea.       Health Status   Allergies:    Allergic Reactions (All)  Severity Not Documented  Clindamycin (Diarrhea)  Canceled/Inactive Reactions (All)  No Known Medication Allergies   Medications:  (Selected)   Prescriptions  Prescribed  FLUoxetine 10 mg oral capsule: = 3 cap(s), Oral, daily, # 90 cap(s), 1 Refill(s), ISIAH, Type: Maintenance, Pharmacy: [facility]  albuterol CFC free 90 mcg/inh inhalation aerosol: 2 puff(s), inh, qid, Instructions: dispense with Valved Chamber please  use with spacer chamber, PRN: as needed for wheezing, # 18 gm, 0 Refill(s), Type: Maintenance, Pharmacy: [facility], 2 puff(s) inh qid,PRN:as needed for wheezing,I...  busPIRone 10 mg oral tablet: = 1 tab(s) ( 10 mg ), PO, TID, # 270 tab(s), 1 Refill(s), Type: Maintenance, Pharmacy: [facility], 1 tab(s) Oral tid  Documented Medications  Documented  Allegra: po, 0 Refill(s), Type: Maintenance  Flonase 50 mcg/inh nasal spray: 1 spray(s), nasal, prn, Type: Maintenance  Keshia 13.5 mg intrauteral device: 1 EA ( 13.5 mg ), intrauteral, once, Instructions: Lot [id number],  inserted 2/9/16, 0 Refill(s), Type: Maintenance   Problem list:    All Problems (Selected)  Tobacco user / SNOMED CT 464844553 / Probable  Obesity / SNOMED CT 1741454956 / Probable  Anxiety / SNOMED CT 5338595635 / Confirmed  Depressive disorder / SNOMED CT 5693838752 / Confirmed      Histories   Past Medical History:    No active or resolved past medical history items have been selected or recorded.   Family History:    No family history items have been selected or recorded.   Procedure history:    Encounter for insertion of intrauterine contraceptive device (Z30.430) on 2/22/2019 at 23 Years.  Comments:  2/24/2019 8:02 AM ATIF - Kelvin SHARIFJennifer IUD insertion  Lot# [id number]  Exp. 01/2021    Due for removal 2/22/24  Extraction of wisdom tooth ([id number]).      Physical Examination   Vital Signs   4/4/2019 10:45 AM CDT Temperature Tympanic 100 DegF    Peripheral Pulse Rate 98 bpm    Pulse Site Radial artery    Respiratory Rate 16 br/min    Systolic Blood Pressure 110 mmHg    Diastolic Blood Pressure 80 mmHg    Mean Arterial Pressure 90 mmHg    BP Site Right arm      Measurements from flowsheet : Measurements   4/4/2019 10:45 AM CDT Height Measured - Standard 70.75 in    Weight Measured - Standard 279 lb    BSA 2.51 m2    Body Mass Index 39.18 kg/m2  HI      General:  No acute distress.    HENT:  Tympanic membranes are clear, No pharyngeal erythema, No sinus tenderness.    Neck:  Supple, Non-tender, No lymphadenopathy.    Respiratory:  Lungs are clear to auscultation.    Cardiovascular:  Normal rate, Regular rhythm, No murmur.    Gastrointestinal:  Soft, Non-distended, Normal bowel sounds, No organomegaly, mild periumbilical tenderness.    Psychiatric:  Appropriate mood & affect.       Impression and Plan   Diagnosis     Gastroenteritis (FIW21-UO K52.9).     Plan:       Diet: Bananas, Rice, Applesauce and Toast diet, Increase fluid intake.    Summary:  push fluids, bland diet, follow up if not improving.     Orders     Orders   Charges (Evaluation and Management):  81635 office outpatient visit 15 minutes (Charge) (Order): Quantity: 1, Gastroenteritis.

## 2022-02-16 NOTE — TELEPHONE ENCOUNTER
---------------------  From: Miguelina Bush   To: MEERA CONTRERAS    Sent: 9/28/2021 9:44:25 AM CDT  Subject: General Message     Virgil Zaidi,  The lab work is negative for chlamydia and gonorrhea and also for herpes.  As discussed, I believe we had a quality sample and the culture result is accurate for the herpes test. You can stop the Valtrex and expect sores to heal. If they do not heal, please see me in follow up, thank you.    ADELFO Coombs      Results:  Date Result Name Value Ref Range   9/22/2021 2:33 PM Microbiology Spec Source NOT GIVEN    9/22/2021 2:33 PM Culture HSV NOT ISOLATED    9/22/2021 2:33 PM Chlamydia RNA NOT DETECTED (NOT DETECTED - )   9/22/2021 2:33 PM Neisseria gonorrhoeae RNA NOT DETECTED (NOT DETECTED - )   9/22/2021 2:33 PM Chlam/N. gonorrhea Comments See comment   Bilateral Helical Rim Advancement Flap Text: The defect edges were debeveled with a #15 blade scalpel.  Given the location of the defect and the proximity to free margins (helical rim) a bilateral helical rim advancement flap was deemed most appropriate.  Using a sterile surgical marker, the appropriate advancement flaps were drawn incorporating the defect and placing the expected incisions between the helical rim and antihelix where possible.  The area thus outlined was incised through and through with a #15 scalpel blade.  With a skin hook and iris scissors, the flaps were gently and sharply undermined and freed up.
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
## 2022-02-16 NOTE — NURSING NOTE
Comprehensive Intake Entered On:  10/19/2020 1:20 PM CDT    Performed On:  10/19/2020 1:14 PM CDT by Jennifer Warren LPN               Summary   Chief Complaint :   lower back pain, along the spine and wraps around the right side, last night bent over to grab keys and back gave out and she fell down, no pain last night, woke with pain   Menstrual Status :   Prophylaxis   Weight Measured :   295.1 lb(Converted to: 295 lb 2 oz, 133.855 kg)    Height Measured :   70.75 in(Converted to: 5 ft 11 in, 179.70 cm)    Body Mass Index :   41.44 kg/m2 (HI)    Body Surface Area :   2.58 m2   Systolic Blood Pressure :   126 mmHg   Diastolic Blood Pressure :   90 mmHg (HI)    Mean Arterial Pressure :   102 mmHg   Peripheral Pulse Rate :   96 bpm   BP Site :   Right arm   BP Method :   Manual   Temperature Tympanic :   97.8 DegF(Converted to: 36.6 DegC)  (LOW)    Oxygen Saturation :   98 %   Race :      Languages :   English   Ethnicity :   Not  or    Jennifer Warren LPN - 10/19/2020 1:14 PM CDT   Health Status   Allergies Verified? :   Yes   Medication History Verified? :   Yes   Immunizations Current :   No   Medical History Verified? :   No   Pre-Visit Planning Status :   Completed   Tobacco Use? :   Current every day smoker   Jennifer Warren LPN - 10/19/2020 1:14 PM CDT   Meds / Allergies   (As Of: 10/19/2020 1:20:04 PM CDT)   Allergies (Active)   clindamycin  Estimated Onset Date:   Unspecified ; Reactions:   diarrhea ; Created By:   Sophia Nuñez CMA; Reaction Status:   Active ; Category:   Drug ; Substance:   clindamycin ; Type:   Allergy ; Updated By:   Sophia Nuñez CMA; Reviewed Date:   10/9/2020 8:34 AM CDT        Medication List   (As Of: 10/19/2020 1:20:04 PM CDT)   Prescription/Discharge Order    aripiprazole  :   aripiprazole ; Status:   Prescribed ; Ordered As Mnemonic:   ARIPiprazole 5 mg oral tablet ; Simple Display Line:   5 mg, 1 tab(s), Oral, daily, 90 EA, 1 Refill(s) ;  Ordering Provider:   Miguelina Bush; Catalog Code:   aripiprazole ; Order Dt/Tm:   10/9/2020 8:54:14 AM CDT          FLUoxetine  :   FLUoxetine ; Status:   Prescribed ; Ordered As Mnemonic:   FLUoxetine 10 mg oral capsule ; Simple Display Line:   3 cap(s), Oral, daily, 270 cap(s), 1 Refill(s) ; Ordering Provider:   Miguelina Bush; Catalog Code:   FLUoxetine ; Order Dt/Tm:   10/9/2020 8:54:41 AM CDT          nicotine  :   nicotine ; Status:   Prescribed ; Ordered As Mnemonic:   nicotine 7 mg/24 hr transdermal film, extended release ; Simple Display Line:   1 patch(es), Topical, daily, for 14 day(s), after 14 days of 14 mg nicotine, 14 patch(es), 0 Refill(s) ; Ordering Provider:   Miguelina Bush; Catalog Code:   nicotine ; Order Dt/Tm:   10/9/2020 8:53:20 AM CDT          nicotine  :   nicotine ; Status:   Prescribed ; Ordered As Mnemonic:   nicotine 14 mg/24 hr transdermal film, extended release ; Simple Display Line:   1 patch(es), Topical, daily, for 14 day(s), start and use prior to 7mg dose, 14 patch(es), 0 Refill(s) ; Ordering Provider:   Miguelina Bush; Catalog Code:   nicotine ; Order Dt/Tm:   10/9/2020 8:53:14 AM CDT            Home Meds    levonorgestrel  :   levonorgestrel ; Status:   Documented ; Ordered As Mnemonic:   Kyleena 19.5 mg intrauterine device ; Simple Display Line:   19.5 mg, 1 EA, Intrauteral, once, inserted 2/22/19, 0 Refill(s) ; Catalog Code:   levonorgestrel ; Order Dt/Tm:   10/9/2020 8:35:49 AM CDT          fluticasone nasal  :   fluticasone nasal ; Status:   Documented ; Ordered As Mnemonic:   Flonase 50 mcg/inh nasal spray ; Simple Display Line:   1 spray(s), nasal, prn ; Catalog Code:   fluticasone nasal ; Order Dt/Tm:   10/20/2015 1:18:23 PM CDT          fexofenadine  :   fexofenadine ; Status:   Documented ; Ordered As Mnemonic:   Allegra ; Simple Display Line:   po, 0 Refill(s) ; Catalog Code:   fexofenadine ; Order Dt/Tm:   5/25/2016 10:39:53 AM CDT            ID Risk  Screen   Recent Travel History :   No recent travel   Family Member Travel History :   No recent travel   Other Exposure to Infectious Disease :   Unknown   Jennifer Warren LPN - 10/19/2020 1:14 PM CDT

## 2022-02-16 NOTE — TELEPHONE ENCOUNTER
Entered by Addie Castaneda CMA on February 06, 2019 2:01:45 PM CST  ---------------------  From: Addie Castaneda CMA   To: [place] [id number]    Sent: 2/6/2019 2:01:44 PM CST  Subject: Medication Management     ** Submitted: **  Order:FLUoxetine (FLUoxetine 10 mg oral capsule)  3 cap(s)  Oral  daily  Qty:  90 cap(s)        Days Supply:  30        Refills:  1          ISIAH     [id number]    Signed by Addie Castaneda CMA  2/6/2019 2:01:00 PM    ** Submitted: **  Complete:FLUoxetine (FLUoxetine 60 mg oral tablet)   Signed by Addie Castaneda CMA  2/6/2019 2:01:00 PM    ** Not Approved:  **  FLUoxetine (FLUOXETINE 10MG CAPSULES)  TAKE THREE CAPSULES BY MOUTH EVERY DAY  Qty:  90 cap(s)        Days Supply:  30        Refills:  0          ISIAH     Route [facility] [id number]   Signed by Addie Castaneda CMA            ------------------------------------------  From: [place] [id number]  To: Shira Hester  Sent: February 6, 2019 1:53:47 PM CST  Subject: Medication Management  Due: February 7, 2019 1:53:47 PM CST    ** On Hold Pending Signature **  Drug: FLUoxetine (FLUoxetine 10 mg oral capsule)  3 CAP(S) ORAL DAILY  Quantity: 90 cap(s)     Days Supply: 0         Refills: 0  Substitutions Allowed  Notes from Pharmacy: Needs appt for further refills    Dispensed Drug: FLUoxetine (FLUoxetine 10 mg oral capsule)  TAKE THREE CAPSULES BY MOUTH EVERY DAY  Quantity: 90 cap(s)     Days Supply: 30        Refills: 0  Substitutions Allowed  Notes from Pharmacy:   ------------------------------------------Date of last office visit and reason:  1/10/19- Anxiety [facility]      Date of last Med Check / Px:   1/10/19  Date of last labs pertaining to med:  CHACHO/PHQ9 1/10/19    RTC order in chart:  Yes, patient due in 5 weeks- med check    For Protocol refill, has patient been contacted:  _

## 2022-02-16 NOTE — PROGRESS NOTES
Patient:   MEERA CONTRERAS            MRN: [id number]            FIN: [id number]               Age:   21 years     Sex:  Female     :  1995   Associated Diagnoses:   Anxiety   Author:   Shira Hester      Visit Information      Primary Care Provider (PCP):  Not recorded.      Chief Complaint   3/27/2017 10:48 AM CDT   Pt here to f/u anxiety. Rx'd Prozac and is going well.        History of Present Illness   17:PPC to discuss anxiety and treatment  remains on zoloft 150mg, first panic attack occured last week  describes disassociation like symptoms which have been ongoign for past 60d  had been on prozac years before for 24 months and stopped taking it because she felt better (she stopped   started smoking again.  irene IUD  GAD7=16   at end of this visit pt was transitioned to prozac    3/27/17  pt is feeling better on prozac, GAD7=10  is seeing Ewelina Brown at [facility] in [place] on weekly basis, has been diagnosed with general anxiety d.o. and depressive d.o.  today she tells me she is feeling much better but has a hard time focusing, although she has never been officially diagnosed with ADD/ADHD  she would like this evaluation  Eng [place] Rating scale: scored 28, cut off at 46, no treatment         Review of Systems   Constitutional:  Negative.    Ear/Nose/Mouth/Throat:  Negative.    Respiratory:  Negative.    Cardiovascular:  Negative.    Gastrointestinal:  Negative.    Hematology/Lymphatics:  Negative.    Immunologic:  Negative.    Musculoskeletal:  Negative.    Integumentary:  Negative.    Neurologic:  Negative.    Psychiatric:  Anxiety, Depression, No steve, Not suicidal, Not delusional, No hallucinations.       Health Status   Allergies:    Allergic Reactions (Selected)  Severity Not Documented  Clindamycin (Diarrhea)   Medications:  (Selected)   Prescriptions  Prescribed  FLUoxetine 10 mg oral capsule: 3 cap(s) ( 30 mg ), po, daily, # 90 cap(s), 1 Refill(s), Type: Maintenance,  Pharmacy: [facility] [id number], Spoke to pt and she knows she needs an appt., 3 cap(s) po daily  albuterol CFC free 90 mcg/inh inhalation aerosol: 2 puff(s), inh, qid, Instructions: dispense with Valved Chamber please  use with spacer chamber, PRN: as needed for wheezing, # 18 gm, 0 Refill(s), Type: Maintenance, Pharmacy: [facility] [id number], 2 puff(s) inh qid,PRN:as needed for wheezing,I...  Documented Medications  Documented  Allegra: po, 0 Refill(s), Type: Maintenance  Flonase 50 mcg/inh nasal spray: 1 spray(s), nasal, prn, Type: Maintenance  Keshia 13.5 mg intrauteral device: 1 EA ( 13.5 mg ), intrauteral, once, Instructions: Lot [id number], inserted 2/9/16, 0 Refill(s), Type: Maintenance   Problem list:    All Problems  Obesity / SNOMED CT 0746977082 / Probable  Tobacco user / SNOMED CT 275801779 / Probable      Histories   Past Medical History:    No active or resolved past medical history items have been selected or recorded.   Family History:    No family history items have been selected or recorded.   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    Eye:  Pupils are equal, round and reactive to light.    HENT:  Normocephalic.    Neck:  Supple.    Musculoskeletal:  Normal range of motion.    Integumentary:  Warm, Dry, Pink.    Neurologic:  Alert, Oriented, Normal sensory, No focal deficits.    Psychiatric:  Cooperative, Appropriate mood & affect,  Normal judgment, Non-suicidal.       Impression and Plan   Diagnosis     Anxiety (FUK52-FL F41.9).     Patient Instructions:       Counseled: Patient, Regarding diagnosis, Regarding treatment, Regarding medications, Activity, Verbalized understanding.    Summary:  fluoxetine refilled for 6 months: did not score over 46 on [place] scale so i cannto justify neurochemical stimulant at this time  I have encouraged her to continue following with counselor and I will see her back in 6 months or sooner if needed, she agrees.    Orders     Orders (Selected)   Prescriptions  Prescribed  FLUoxetine 10 mg oral capsule: 3 cap(s) ( 30 mg ), po, daily, # 90 cap(s), 1 Refill(s), Type: Maintenance, Pharmacy: [facility] [id number], Spoke to pt and she knows she needs an appt., 3 cap(s) po daily.

## 2022-02-16 NOTE — NURSING NOTE
Comprehensive Intake Entered On:  9/22/2021 1:57 PM CDT    Performed On:  9/22/2021 1:52 PM CDT by Jennifer Warren LPN               Summary   Chief Complaint :   thinks she has a vaginal yeast infection, took Diflucan yesterday, has a bump in the vaginal area that was bleeding, discharge and itchiness   Weight Measured :   299.9 lb(Converted to: 299 lb 14 oz, 136.032 kg)    Height Measured :   70.75 in(Converted to: 5 ft 11 in, 179.70 cm)    Body Mass Index :   42.12 kg/m2 (HI)    Body Surface Area :   2.6 m2   Systolic Blood Pressure :   116 mmHg   Diastolic Blood Pressure :   76 mmHg   Mean Arterial Pressure :   89 mmHg   Peripheral Pulse Rate :   104 bpm (HI)    BP Site :   Right arm   BP Method :   Manual   Temperature Tympanic :   98.3 DegF(Converted to: 36.8 DegC)    Oxygen Saturation :   97 %   Race :      Languages :   English   Ethnicity :   Not  or    Jennifer aWrren LPN - 9/22/2021 1:52 PM CDT   Health Status   Allergies Verified? :   Yes   Medication History Verified? :   Yes   Immunizations Current :   No   Medical History Verified? :   No   Pre-Visit Planning Status :   Completed   Tobacco Use? :   Never smoker   Jennifer Warren LPN - 9/22/2021 1:52 PM CDT   Meds / Allergies   (As Of: 9/22/2021 1:57:21 PM CDT)   Allergies (Active)   clindamycin  Estimated Onset Date:   Unspecified ; Reactions:   diarrhea ; Created By:   Sophia Nuñez CMA; Reaction Status:   Active ; Category:   Drug ; Substance:   clindamycin ; Type:   Allergy ; Updated By:   Sophia Nuñez CMA; Reviewed Date:   9/15/2021 10:30 AM CDT        Medication List   (As Of: 9/22/2021 1:57:21 PM CDT)   Prescription/Discharge Order    nicotine  :   nicotine ; Status:   Prescribed ; Ordered As Mnemonic:   nicotine 14 mg/24 hr transdermal film, extended release ; Simple Display Line:   1 patch(es), Topical, daily, for 14 day(s), start and use prior to 7mg dose, 14 patch(es), 0 Refill(s) ; Ordering Provider:    Miguelina Bush; Catalog Code:   nicotine ; Order Dt/Tm:   10/9/2020 8:53:14 AM CDT          nicotine  :   nicotine ; Status:   Prescribed ; Ordered As Mnemonic:   nicotine 7 mg/24 hr transdermal film, extended release ; Simple Display Line:   1 patch(es), Topical, daily, for 14 day(s), after 14 days of 14 mg nicotine, 14 patch(es), 0 Refill(s) ; Ordering Provider:   Miguelina Bush; Catalog Code:   nicotine ; Order Dt/Tm:   10/9/2020 8:53:20 AM CDT          FLUoxetine  :   FLUoxetine ; Status:   Prescribed ; Ordered As Mnemonic:   FLUoxetine 10 mg oral capsule ; Simple Display Line:   3 cap(s), Oral, daily, 270 cap(s), 1 Refill(s) ; Ordering Provider:   Miguelina Bush; Catalog Code:   FLUoxetine ; Order Dt/Tm:   10/9/2020 8:54:41 AM CDT          fluticasone nasal  :   fluticasone nasal ; Status:   Prescribed ; Ordered As Mnemonic:   Flonase 50 mcg/inh nasal spray ; Simple Display Line:   2 spray(s), nasal, daily, 16 gm, 3 Refill(s) ; Ordering Provider:   Jelani Manning MD; Catalog Code:   fluticasone nasal ; Order Dt/Tm:   3/4/2021 12:00:58 PM CST          aripiprazole  :   aripiprazole ; Status:   Prescribed ; Ordered As Mnemonic:   ARIPiprazole 5 mg oral tablet ; Simple Display Line:   5 mg, 1 tab(s), Oral, daily, 90 EA, 1 Refill(s) ; Ordering Provider:   Miguelina Bush; Catalog Code:   aripiprazole ; Order Dt/Tm:   10/9/2020 8:54:14 AM CDT          fluconazole  :   fluconazole ; Status:   Prescribed ; Ordered As Mnemonic:   Diflucan 150 mg oral tablet ; Simple Display Line:   150 mg, 1 tab(s), PO, Once, 1 tab(s), 0 Refill(s) ; Ordering Provider:   Miguelina Bush; Catalog Code:   fluconazole ; Order Dt/Tm:   9/21/2021 11:53:58 AM CDT          venlafaxine  :   venlafaxine ; Status:   Prescribed ; Ordered As Mnemonic:   Effexor XR 75 mg oral capsule, extended release ; Simple Display Line:   75 mg, 1 cap(s), Oral, daily, 30 cap(s), 1 Refill(s) ; Ordering Provider:   Raul Arango MD; Catalog  Code:   venlafaxine ; Order Dt/Tm:   9/15/2021 11:06:48 AM CDT            Home Meds    fexofenadine  :   fexofenadine ; Status:   Documented ; Ordered As Mnemonic:   Allegra ; Simple Display Line:   po, 0 Refill(s) ; Catalog Code:   fexofenadine ; Order Dt/Tm:   5/25/2016 10:39:53 AM CDT          levonorgestrel  :   levonorgestrel ; Status:   Documented ; Ordered As Mnemonic:   Kyleena 19.5 mg intrauterine device ; Simple Display Line:   19.5 mg, 1 EA, Intrauteral, once, inserted 2/22/19, 0 Refill(s) ; Catalog Code:   levonorgestrel ; Order Dt/Tm:   10/9/2020 8:35:49 AM CDT

## 2022-02-16 NOTE — PROGRESS NOTES
Patient:   MEERA CONTRERAS            MRN: [id number]            FIN: [id number]               Age:   23 years     Sex:  Female     :  1995   Associated Diagnoses:   Acute sore throat   Author:   Mars Alvarado MD      Chief Complaint   2018 4:25 PM CST    Sore throat, swollen tonsills.  x3 days      History of Present Illness             The patient presents with a sore throat.  The location is generalized and both sides of the throat.  The onset was gradual.  There were relieving factors including medication.  It is described as aching and burning.  The severity is moderate.  The symptom occurs constantly.  The course is worsening.  Associated symptoms painful swallowing.        Review of Systems   Constitutional:  Negative.    Eye:  Negative.    Respiratory:  Negative.    Cardiovascular:  Negative.       Health Status   Allergies:    Allergic Reactions (Selected)  Severity Not Documented  Clindamycin (Diarrhea)   Medications:  (Selected)   Prescriptions  Prescribed  BuSpar 10 mg oral tablet: 1 tab(s) ( 10 mg ), PO, TID, # 90 tab(s), 1 Refill(s), Type: Maintenance, Pharmacy: [facility] [id number], 1 tab(s) po tid  FLUoxetine 10 mg oral capsule: See Instructions, Instructions: TAKE 3 CAPSULES BY MOUTH DAILY, # 90 cap(s), Type: Soft Stop, Pharmacy: [facility] [id number]  albuterol CFC free 90 mcg/inh inhalation aerosol: 2 puff(s), inh, qid, Instructions: dispense with Valved Chamber please  use with spacer chamber, PRN: as needed for wheezing, # 18 gm, 0 Refill(s), Type: Maintenance, Pharmacy: [facility] [id number], 2 puff(s) inh qid,PRN:as needed for wheezing,I...  Documented Medications  Documented  Allegra: po, 0 Refill(s), Type: Maintenance  Flonase 50 mcg/inh nasal spray: 1 spray(s), nasal, prn, Type: Maintenance  Keshia 13.5 mg intrauteral device: 1 EA ( 13.5 mg ), intrauteral, once, Instructions: Lot [id number], inserted 16, 0 Refill(s), Type: Maintenance   Problem list:    All  Problems  Anxiety / SNOMED CT 0734984331 / Confirmed  Depressive disorder / SNOMED CT 2502263108 / Confirmed  Obesity / SNOMED CT 5504466065 / Probable  Tobacco user / SNOMED CT 795444429 / Probable  Canceled: Viral URI with cough / SNOMED CT 74547741      Histories   Past Medical History:    No active or resolved past medical history items have been selected or recorded.   Family History:    No family history items have been selected or recorded.   Procedure history:    Extraction of wisdom tooth (SNOMED CT 300463037).   Social History:        Alcohol Assessment            Current, 1-2 times per week, 3 drinks/episode average.      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    HENT:  Normocephalic.         Throat: Tonsils ( Erythematous ), Pharynx ( Erythematous ).    Neck:  Supple.         Lymph nodes: Bilateral, Cervical chain, Anterior triangle, Palpable, Tender.    Neurologic:  Alert, Oriented.       Review / Management   Results review:  strept test neg.       Impression and Plan   Diagnosis     Acute sore throat (BLE29-CJ J02.9).     Orders     Orders (Selected)   Outpatient Orders  Ordered  Physical Therapy Evaluation and Treatment (Request): Lumbar pain  Rectal pain  Return to Clinic (Request): RFV: repeat thyroid US for nodule, Return in 1 year  Return to Clinic  (Request): RFV: vaginal US, Return in please schedule with TAW  Return to Clinic (Request): Return in 4 weeks  Ordered (Dispatched)  POC, GROUP A STREP* ([facility]): Specimen Type: Swab, Collection Date: 11/09/18 16:30:00 CST  Ordered (In Transit)  TSH* ([facility]): Specimen Type: Serum, Collection Date: 05/14/18 19:37:00 CDT  Prescriptions  Prescribed  BuSpar 10 mg oral tablet: 1 tab(s) ( 10 mg ), PO, TID, # 90 tab(s), 1 Refill(s), Type: Maintenance, Pharmacy: [facility] [id number], 1 tab(s) po tid  FLUoxetine 10 mg oral capsule: See Instructions, Instructions: TAKE 3 CAPSULES BY MOUTH DAILY, # 90 cap(s), Type: Soft Stop, Pharmacy: [facility] [address]  albuterol CFC free 90 mcg/inh inhalation aerosol: 2 puff(s), inh, qid, Instructions: dispense with Valved Chamber please  use with spacer chamber, PRN: as needed for wheezing, # 18 gm, 0 Refill(s), Type: Maintenance, Pharmacy: [facility] [address], 2 puff(s) inh qid,PRN:as needed for wheezing,I...  Documented Medications  Documented  Allegra: po, 0 Refill(s), Type: Maintenance  Flonase 50 mcg/inh nasal spray: 1 spray(s), nasal, prn, Type: Maintenance  Keshia 13.5 mg intrauteral device: 1 EA ( 13.5 mg ), intrauteral, once, Instructions: Lot [id number], inserted 2/9/16, 0 Refill(s), Type: Maintenance.     Plan:       Follow-up: With Primary Care Provider, As needed or sooner if symptoms worsen.    Patient Instructions:  Launch follow-up (if licensed).

## 2022-02-16 NOTE — PROGRESS NOTES
Patient:   MEERA CONTRERAS            MRN: [id number]            FIN: [id number]               Age:   25 years     Sex:  Female     :  1995   Associated Diagnoses:   None   Author:   Jelani Manning MD       -   Today's date:  3/4/2021 12:02:00 PM .        -   To whom it may concern:        This patient is currently under my care and Was seen in my office on  3/4/2021.  .  Patient was tested for Covid on 3-2-2021,   she is negative on 3-4-2021.   She had fever 3-3-2021 so should still be on home rest till afebrile for 3 days   Please contact me if you have any questions or concerns.      -   Sincerely,

## 2022-02-16 NOTE — NURSING NOTE
Depression Screening Entered On:  10/12/2020 9:52 AM CDT    Performed On:  10/9/2020 9:52 AM CDT by Soledad Bourgeois               Depression Screening   Little Interest - Pleasure in Activities :   More than half the days   Feeling Down, Depressed, Hopeless :   Several days   Initial Depression Screen Score :   3 Score   Poor Appetite or Overeating :   Nearly every day   Trouble Falling or Staying Asleep :   Several days   Feeling Tired or Little Energy :   More than half the days   Feeling Bad About Yourself :   More than half the days   Trouble Concentrating :   Not at all   Moving or Speaking Slowly :   Not at all   Thoughts Better Off Dead or Hurting Self :   Not at all   CHACHO Difficulty with Work, Home, Others :   Somewhat difficult   Detailed Depression Screen Score :   8    Total Depression Screen Score :   11    Soledad Bourgeois - 10/12/2020 9:52 AM CDT

## 2022-02-16 NOTE — NURSING NOTE
CAGE Assessment Entered On:  1/10/2019 3:15 PM CST    Performed On:  1/10/2019 3:14 PM CST by Leatha Means               Assessment   Have you ever felt you should cut down on your drinking :   No   Have people annoyed you by criticizing your drinking :   No   Have you ever felt bad or guilty about your drinking :   No   Have you ever taken a drink first thing in the morning to steady your nerves or get rid of a hangover (Eye-opener) :   No   CAGE Score :   0    Leatha Means - 1/10/2019 3:14 PM CST

## 2022-02-16 NOTE — TELEPHONE ENCOUNTER
---------------------  From: Marlys MARSHALL Helen   Sent: 3/2/2021 2:33:07 PM CST  Subject: Curbside Testing     Pt here for curbside testing for covid,influenza, and strep test per Miguelina Gonzalez. 02 Sat=99%. Specimen sent to [facility]. [facility].

## 2022-02-16 NOTE — PROGRESS NOTES
Patient:   MEERA CONTRERAS            MRN: [id number]            FIN: [id number]               Age:   22 years     Sex:  Female     :  1995   Associated Diagnoses:   Anxiety   Author:   Shira Hester      Visit Information      Primary Care Provider (PCP):  Shira Hester    NPI# [id number]      Chief Complaint   2017 9:27 AM CDT     F/u for anxiety and depression. Also medication check.      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    Ear/Nose/Mouth/Throat:  Negative.    Respiratory:  Negative.    Cardiovascular:  Negative.    Gastrointestinal:  Negative.    Hematology/Lymphatics:  Negative.    Immunologic:  Negative.    Musculoskeletal:  Negative.    Integumentary:  Negative.    Neurologic:  Negative.    Psychiatric:  Anxiety, No depression, No steve, Not suicidal, Not delusional, No hallucinations.       Health Status   Allergies:    Allergic Reactions (Selected)  Severity Not Documented  Clindamycin (Diarrhea)   Medications:  (Selected)   Prescriptions  Prescribed  BuSpar 10 mg oral tablet: 1 tab(s) ( 10 mg ), PO, TID, # 90 tab(s), 1 Refill(s), Type: Maintenance, [facility], 1 tab(s) po tid  FLUoxetine 10 mg oral capsule: 3 cap(s) ( 30 mg ), po, daily, # 270 cap(s), 3 Refill(s), Type: Maintenance, [facility]acy[facility] [address], pt due now for med check follow up per [facility] 3 cap(s) po daily  albuterol CFC free 90  mcg/inh inhalation aerosol: 2 puff(s), inh, qid, Instructions: dispense with Valved Chamber please  use with spacer chamber, PRN: as needed for wheezing, # 18 gm, 0 Refill(s), Type: Maintenance, Pharmacy: [facility], 2 puff(s) inh qid,PRN:as needed for wheezing,I...  Documented Medications  Documented  Allegra: po, 0 Refill(s), Type: Maintenance  Flonase 50 mcg/inh nasal spray: 1 spray(s), nasal, prn, Type: Maintenance  Keshia 13.5 mg intrauteral device: 1 EA ( 13.5 mg ), intrauteral, once, Instructions: Lot [id number], inserted 2/9/16, 0 Refill(s), Type: Maintenance   Problem list:    All Problems (Selected)  Anxiety / SNOMED CT 1178979113 / Confirmed  Depressive disorder / SNOMED CT 2180019459 / Confirmed  Obesity / SNOMED CT 4826772623 / Probable  Tobacco user / SNOMED CT 821583870 / Probable      Histories   Past Medical History:    No active or resolved past medical history items have been selected or recorded.   Family History:    No family history items have been selected or recorded.   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    Eye:  Pupils are equal, round and reactive to light.    HENT:  Normocephalic.    Neck:  Supple.    Musculoskeletal:  Normal range of motion.    Integumentary:  Warm, Dry, Pink.    Neurologic:  Alert, Oriented, Normal sensory, No focal deficits.    Psychiatric:  Cooperative, Appropriate mood & affect, Normal judgment, Non-suicidal.        Impression and Plan   Diagnosis     Anxiety (TPI85-QJ F41.9).     Patient Instructions:       Counseled: Patient, Regarding diagnosis, Regarding treatment, Regarding medications, Verbalized understanding.    Summary:  refilled prozac, we reviewed her PHQ and CHACHO forms, scores would indicate lack of control over anxiety and depression but Dyan tells me she is feeling good on her current dose and admits there are more anxious days and less anxious days depending on what is occuring in her life and she would prefer to avoid a dose increase, she is willing to return to clinic if she feel valery anxiety is worsening and will continue with therapy .    Orders     Orders (Selected)   Prescriptions  Prescribed  FLUoxetine 10 mg oral capsule: 3 cap(s) ( 30 mg ), po, daily, # 270 cap(s), 3 Refill(s), Type: Maintenance, [facility], pt due now for med check follow up per [facility] 3 cap(s) po daily.

## 2022-02-16 NOTE — TELEPHONE ENCOUNTER
---------------------  From: Miguelina Bush   To: DYAN CONTRERAS    Sent: 3/4/2021 10:52:53 AM CST  Subject: General Message     The strep culture is negativeDyan.      Results:  Date Result Name Value   3/2/2021 2:49 PM Culture Strep A See comment

## 2022-02-16 NOTE — PROGRESS NOTES
Patient:   DYAN CONTRERAS            MRN: [id number]            FIN: [id number]               Age:   21 years     Sex:  Female     :  1995   Associated Diagnoses:   Anxiety; Depressive disorder   Author:   Shira Hester      Visit Information      Primary Care Provider (PCP):  Not recorded.      Chief Complaint   2017 10:20 AM CDT   f/u anxiety. Counselor thought may need to add in supplement med.      History of Present Illness   17:PPC to discuss anxiety and treatment  remains on zoloft 150mg, first panic attack occured last week  describes disassociation like symptoms which have been ongoign for past 60d  had been on prozac years before for 24 months and stopped taking it because she felt better (she stopped   started smoking again.  irene IUD  GAD7=16   at end of this visit pt was transitioned to prozac    3/27/17  pt is feeling better on prozac, GAD7=10  is seeing Ewelina Brown at [facility] in [place] on weekly basis, has been diagnosed with general anxiety d.o. and depressive d.o.  today she tells me she is feeling much better but has a hard time focusing, although she has never been officially diagnosed with ADD/ADHD  she would like this evaluation  Eng [place] Rating scale: scored 28, cut off at 46, no treatment    17  PPC to discuss on going issues with anxiety and depression last seen 3/27/17.    sees counselor weekly, counselor recommended addition of buspar to her SSRI regimen  Dyan tells me her depression is much better but her anxiety has worsened as she is dealing with stress of end of semester, denies thoughts of suicide or homicide      Review of Systems   Constitutional:  Negative.    Ear/Nose/Mouth/Throat:  Negative.    Respiratory:  Negative.    Cardiovascular:  Negative.    Gastrointestinal:  Negative.    Hematology/Lymphatics:  Negative.    Immunologic:  Negative.    Musculoskeletal:  Negative.    Integumentary:  Negative.    Neurologic:  Negative.     Psychiatric:  Anxiety, Depression, No steve, Not suicidal, Not delusional, No hallucinations.       Health Status   Allergies:    Allergic Reactions (Selected)  Severity Not Documented  Clindamycin (Diarrhea)   Medications:  (Selected)   Prescriptions  Prescribed  BuSpar 10 mg oral tablet: 1 tab(s) ( 10 mg ), PO, TID, # 90 tab(s), 1 Refill(s), Type: Maintenance, Pharmacy: [facility] [address], 1 tab(s) po tid  FLUoxetine 10 mg oral capsule: 3 cap(s) ( 30 mg ), po, daily, # 90 cap(s), 1 Refill(s), Type: Maintenance, Pharmacy: [facility] [address], Spoke to pt and she knows she needs an appt., 3 cap(s) po daily  albuterol CFC free 90 mcg/inh inhalation aerosol: 2 puff(s), inh, qid, Instructions: dispense with Valved Chamber please  use with spacer chamber, PRN: as needed for wheezing, # 18 gm, 0 Refill(s), Type: Maintenance, Pharmacy: [facility], 2 puff(s) inh qid,PRN:as needed for wheezing,I...  Documented Medications  Documented  Allegra: po, 0 Refill(s), Type: Maintenance  Flonase 50 mcg/inh nasal spray: 1 spray(s), nasal, prn, Type: Maintenance  Keshia 13.5 mg intrauteral device: 1 EA ( 13.5 mg ), intrauteral, once, Instructions: Lot [id number], inserted 2/9/16, 0 Refill(s), Type: Maintenance   Problem list:    All Problems  Anxiety / SNOMED CT 9132807557 / Confirmed  Depressive disorder / SNOMED CT 8731598919 / Confirmed  Obesity / SNOMED CT 3950832255 / Probable  Tobacco user / SNOMED CT 189836653 / Probable      Histories   Past Medical History:    No active or resolved past medical history items have been selected or recorded.   Family History:    No family history items have been selected or recorded.   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    Eye:  Pupils are equal, round and reactive to light.    HENT:  Normocephalic.    Neck:  Supple.    Musculoskeletal:  Normal range of motion.    Integumentary:  Warm, Dry, Pink.    Neurologic:  Alert, Oriented, Normal sensory, No focal deficits.    Psychiatric:  Cooperative, Appropriate mood & affect, Normal judgment, Non-suicidal.       Impression and Plan   Diagnosis     Anxiety (PRJ99-ND F41.9).     Depressive disorder (VTP79-PG F32.9).     Patient Instructions:       Counseled: Patient, Regarding diagnosis, Regarding treatment, Regarding medications, Activity, Verbalized understanding.    Summary:  discussed pros and cons of buspar addition, explained how to take medication, will use this in morning and lunch time, she has ability to look at dinner time dose but I often do not see pt's needing third dose in day  remain on fluoxetine, will see her back in 4-8 weeks.    Orders     Orders (Selected)   Prescriptions  Prescribed  BuSpar 10 mg oral tablet: 1 tab(s) ( 10 mg ), PO, TID, # 90 tab(s), 1 Refill(s), Type: Maintenance, Pharmacy: [facility], 1 tab(s) po tid.

## 2022-02-16 NOTE — NURSING NOTE
"Comprehensive Intake Entered On:  11/24/2021 7:14 AM CST    Performed On:  11/24/2021 7:07 AM CST by Jennifer Warren LPN               Summary   Chief Complaint :   had a \"cold\" that started about 4 weeks ago, the ears have felt plugged up and slight discomfort over the past week, yesterday had some ringing in the ears   Weight Measured :   299.7 lb(Converted to: 299 lb 11 oz, 135.942 kg)    Height Measured :   70.75 in(Converted to: 5 ft 11 in, 179.70 cm)    Body Mass Index :   42.09 kg/m2 (HI)    Body Surface Area :   2.6 m2   Systolic Blood Pressure :   126 mmHg   Diastolic Blood Pressure :   90 mmHg (HI)    Mean Arterial Pressure :   102 mmHg   Peripheral Pulse Rate :   91 bpm   BP Site :   Right arm   BP Method :   Manual   Temperature Tympanic :   98.3 DegF(Converted to: 36.8 DegC)    Oxygen Saturation :   96 %   Race :      Languages :   English   Ethnicity :   Not  or    Jennifer Warren LPN - 11/24/2021 7:07 AM CST   Health Status   Allergies Verified? :   Yes   Medication History Verified? :   Yes   Immunizations Current :   No   Medical History Verified? :   No   Pre-Visit Planning Status :   Completed   Tobacco Use? :   Never smoker   Jennifer Warren LPN - 11/24/2021 7:07 AM CST   Meds / Allergies   (As Of: 11/24/2021 7:14:16 AM CST)   Allergies (Active)   clindamycin  Estimated Onset Date:   Unspecified ; Reactions:   diarrhea ; Created By:   Sophia Nuñez CMA; Reaction Status:   Active ; Category:   Drug ; Substance:   clindamycin ; Type:   Allergy ; Updated By:   Sophia Nuñez CMA; Reviewed Date:   9/15/2021 10:30 AM CDT        Medication List   (As Of: 11/24/2021 7:14:16 AM CST)   Prescription/Discharge Order    nicotine  :   nicotine ; Status:   Prescribed ; Ordered As Mnemonic:   nicotine 14 mg/24 hr transdermal film, extended release ; Simple Display Line:   1 patch(es), Topical, daily, for 14 day(s), start and use prior to 7mg dose, 14 patch(es), 0 Refill(s) ; " Ordering Provider:   Miguelina Bush; Catalog Code:   nicotine ; Order Dt/Tm:   10/9/2020 8:53:14 AM CDT          nicotine  :   nicotine ; Status:   Prescribed ; Ordered As Mnemonic:   nicotine 7 mg/24 hr transdermal film, extended release ; Simple Display Line:   1 patch(es), Topical, daily, for 14 day(s), after 14 days of 14 mg nicotine, 14 patch(es), 0 Refill(s) ; Ordering Provider:   Miguelina Bush; Catalog Code:   nicotine ; Order Dt/Tm:   10/9/2020 8:53:20 AM CDT          fluticasone nasal  :   fluticasone nasal ; Status:   Prescribed ; Ordered As Mnemonic:   Flonase 50 mcg/inh nasal spray ; Simple Display Line:   2 spray(s), nasal, daily, 16 gm, 3 Refill(s) ; Ordering Provider:   Jelani Manning MD; Catalog Code:   fluticasone nasal ; Order Dt/Tm:   3/4/2021 12:00:58 PM [facility]            Home Meds    fexofenadine  :   fexofenadine ; Status:   Documented ; Ordered As Mnemonic:   Allegra ; Simple Display Line:   po, 0 Refill(s) ; Catalog Code:   fexofenadine ; Order Dt/Tm:   5/25/2016 10:39:53 AM CDT          levonorgestrel  :   levonorgestrel ; Status:   Documented ; Ordered As Mnemonic:   Kyleena 19.5 mg intrauterine device ; Simple Display Line:   19.5 mg, 1 EA, Intrauteral, once, inserted 2/22/19, 0 Refill(s) ; Catalog Code:   levonorgestrel ; Order Dt/Tm:   10/9/2020 8:35:49 AM CDT

## 2022-02-16 NOTE — PROGRESS NOTES
"   Patient:   MEERA CONTRERAS            MRN: [id number]            FIN: [id number]               Age:   26 years     Sex:  Female     :  1995   Associated Diagnoses:   Plugged feeling in ear   Author:   Miguelina Bush      Visit Information      Date of Service: 2021 07:00 am  Performing Location: [facility]  Encounter#: [id number]      Primary Care Provider (PCP):  Miguelina Bush    NPI# [id number]      Referring Provider:  Miguelina Bush    NPI# [id number]      Chief Complaint   2021 7:07 AM CST   had a \"cold\" that started about 4 weeks ago, the ears have felt plugged up and slight discomfort over the past week, yesterday had some ringing in the ears      History of Present Illness   has had a 'cold' for 4 weeks, 'one right after the next'. Worried about ear fullness and pain that started last night. Relieved this morning with advil last night. NO fever, no cough, no sore throat. She has had 2 covid vaccines and scheduled for booster last week, doesn't want today or will be sick for big family gathering tomorrrow      Health Status   Allergies:    Allergic Reactions (Selected)  Severity Not Documented  Clindamycin (Diarrhea)   Medications:  (Selected)   Prescriptions  Prescribed  Flonase 50 mcg/inh nasal spray: = 2 spray(s), nasal, daily, # 16 gm, 3 Refill(s), Type: Maintenance, Pharmacy: [facility] #[id number], 2 spray(s) Nasal daily, 70.75, in, 21 11:32:00 CST, Height Measured, 299, lb, 21 11:32:00 CST, Weight Measured  nicotine 14 mg/24 hr transdermal film, extended release: 1 patch(es), Topical, daily, Instructions: start and use prior to 7mg dose, # 14 patch(es), 0 Refill(s), Type: Maintenance, Pharmacy: [facility] #[id number], 1 patch(es) Topical daily,x14 day(s),Instr:start and use prior to 7mg dose, 70.75, in, 1...  nicotine 7 mg/24 hr transdermal film, extended release: 1 patch(es), Topical, daily, Instructions: after 14 days of 14 mg " nicotine, # 14 patch(es), 0 Refill(s), Type: Maintenance, [id number], 1 patch(es) Topical daily,x14 day(s),Instr:after 14 days of 14 mg nicotine, 70.75, in, 1...  Documented Medications  Documented  Allegra: po, 0 Refill(s), Type: Maintenance  Kyleena 19.5 mg intrauterine device: = 1 EA ( 19.5 mg ), Intrauteral, once, Instructions: inserted 2/22/19, 0 Refill(s), Type: Maintenance,    Medications          *denotes recorded medication          *Allegra: po, 0 Refill(s).          Flonase 50 mcg/inh nasal spray: 2 spray(s), nasal, daily, 16 gm, 3 Refill(s).          *Kyleena 19.5 mg intrauterine device: 19.5 mg, 1 EA, Intrauteral, once, inserted 2/22/19, 0 Refill(s).          nicotine 14 mg/24 hr transdermal film, extended release: 1 patch(es), Topical, daily, for 14 day(s), start and use prior to 7mg dose, 14 patch(es), 0 Refill(s).          nicotine 7 mg/24 hr transdermal film, extended release: 1 patch(es), Topical, daily, for 14 day(s), after 14 days of 14 mg nicotine, 14 patch(es), 0 Refill(s).       Problem list:    All Problems  Tobacco user / SNOMED CT 751539510 / Probable  Obesity / SNOMED CT 6781255955 / Probable  Depressive disorder / SNOMED CT 0092738616 / Confirmed  Anxiety / SNOMED CT 2034780978 / Confirmed  Canceled: Viral URI with cough / SNOMED CT 92103546      Histories   Past Medical History:    No active or resolved past medical history items have been selected or recorded.   Family History:    No family history items have been selected or recorded.   Procedure history:    Encounter for insertion of intrauterine contraceptive device (ICD-10-CM Z30.430) performed by Miguelina Bush on 2/22/2019 at 23 Years.  Comments:  2/24/2019 8:02 AM Jennifer Bourne LPN  Kyleena IUD insertion  Lot# [id number]  Exp. 01/2021    Due for removal 2/22/24  Extraction of wisdom tooth (SNOMED CT 067734790).   Social History:        Electronic Cigarette/Vaping Assessment             Electronic Cigarette Use: vapes.  1/2 cartridge/day Use Per Day.      Alcohol Assessment            Current, 1 TIME, 4 drinks/episode average.  5 drinks/episode maximum.      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    Eye:  Normal conjunctiva.    HENT:  Tympanic membranes are clear, Normal hearing.    Neck:  Supple, Non-tender.    Respiratory:  Lungs are clear to auscultation, Respirations are non-labored, Breath sounds are equal.    Integumentary:  Warm, Dry, Pink.       Impression and Plan   Diagnosis     Plugged feeling in ear (MQH60-CA H93.8X9).     Course:  Improving.    Plan:  advised home or quick OOVID test before family gathering  can not offer that here, she will see other sources today.    Patient Instructions:       Counseled: Patient, Regarding diagnosis, Regarding treatment, Regarding medications, Verbalized understanding, Counseled on symptomatic management. Return to clinic for re evaluation if worsening, simply not improving, or failure to resolve.   .

## 2022-02-16 NOTE — TELEPHONE ENCOUNTER
---------------------  From: Osbaldo Ordaz CMA ([id number]   To: Appointment Pool ([id number]_[place]);     Sent: 2/1/2021 9:12:40 AM CST !  Subject: COVID testing      Please add pt to curbside for STREP AND covid per [facility].  Thank you osbaldo Ordaz CMAscheduled for today 230

## 2022-03-02 NOTE — TELEPHONE ENCOUNTER
---------------------  From: Ayah Rosas RN (Phone Messages Pool ([id number]_[facility]))   To: [employer] Message Pool ([id number]);     Sent: 1/6/2022 8:39:23 AM CST  Subject: Yeast infection       PCP:   [facility], Seen by TFS on 12/31/21      Time of Call:  [id number]       Person Calling:  Pt  Phone number:  [phone]    Note:   Pt calling stating that she has Strep and COVID and was give antibiotics.  She just started taking them on Friday and already has noticed that she is having a yeast infection.  Pt states that this is a typical reaction whenever she is on antibiotics.  She is requesting a prescription of Diflucan be sent in for her.  Please advise.    Last office visit and reason:  12/31/21  Video Visit---------------------  From: Moon Alves CMA ([employer] Message Pool ([id number]))   To: Mars Alvarado MD;     Sent: 1/6/2022 9:12:16 AM CST  Subject: FW: Yeast infection     Amoxicillin rx'd 12/31. Hx of Diflucan 150mg #1 for yeast infection sx - last rx'd 9/21/21 [facility].---------------------  From: Mars Alvarado MD   To: [employer] Message Pool ([id number]);     Sent: 1/6/2022 9:57:28 AM CST  Subject: RE: Yeast infection     ok to refill same # and directionsdone. pt wanted #2 since still has another 6 days of med and worried will still have sx once completed.

## 2022-03-02 NOTE — NURSING NOTE
Comprehensive Intake Entered On:  12/31/2021 10:31 AM CST    Performed On:  12/31/2021 10:28 AM CST by Moon Alves CMA               Summary   Chief Complaint :   Verbal consent given for video visit. ST, fever, HA, body aches since 12/29. At home test negative yesterday for COVID.   Race :      Languages :   English   Ethnicity :   Not  or    Moon Alves CMA - 12/31/2021 10:28 AM CST   Health Status   Allergies Verified? :   Yes   Medication History Verified? :   Yes   Immunizations Current :   No   Pre-Visit Planning Status :   Not completed   Moon Alves CMA - 12/31/2021 10:28 AM CST   Meds / Allergies   (As Of: 12/31/2021 10:31:56 AM CST)   Allergies (Active)   clindamycin  Estimated Onset Date:   Unspecified ; Reactions:   diarrhea ; Created By:   Sophia Nuñez CMA; Reaction Status:   Active ; Category:   Drug ; Substance:   clindamycin ; Type:   Allergy ; Updated By:   Sophia Nuñez CMA; Reviewed Date:   9/15/2021 10:30 AM CDT        Medication List   (As Of: 12/31/2021 10:31:56 AM CST)   Prescription/Discharge Order    fluticasone nasal  :   fluticasone nasal ; Status:   Prescribed ; Ordered As Mnemonic:   Flonase 50 mcg/inh nasal spray ; Simple Display Line:   2 spray(s), nasal, daily, 16 gm, 3 Refill(s) ; Ordering Provider:   Jelani Manning MD; Catalog Code:   fluticasone nasal ; Order Dt/Tm:   3/4/2021 12:00:58 PM CST          nicotine  :   nicotine ; Status:   Prescribed ; Ordered As Mnemonic:   nicotine 14 mg/24 hr transdermal film, extended release ; Simple Display Line:   1 patch(es), Topical, daily, for 14 day(s), start and use prior to 7mg dose, 14 patch(es), 0 Refill(s) ; Ordering Provider:   Miguelina Bush; Catalog Code:   nicotine ; Order Dt/Tm:   10/9/2020 8:53:14 AM CDT          nicotine  :   nicotine ; Status:   Prescribed ; Ordered As Mnemonic:   nicotine 7 mg/24 hr transdermal film, extended release ; Simple Display Line:   1 patch(es), Topical,  daily, for 14 day(s), after 14 days of 14 mg nicotine, 14 patch(es), 0 Refill(s) ; Ordering Provider:   Miguelina Bush; Catalog Code:   nicotine ; Order Dt/Tm:   10/9/2020 8:53:20 AM CDT            Home Meds    fexofenadine  :   fexofenadine ; Status:   Documented ; Ordered As Mnemonic:   Allegra ; Simple Display Line:   po, 0 Refill(s) ; Catalog Code:   fexofenadine ; Order Dt/Tm:   5/25/2016 10:39:53 AM CDT          levonorgestrel  :   levonorgestrel ; Status:   Documented ; Ordered As Mnemonic:   Kyleena 19.5 mg intrauterine device ; Simple Display Line:   19.5 mg, 1 EA, Intrauteral, once, inserted 2/22/19, 0 Refill(s) ; Catalog Code:   levonorgestrel ; Order Dt/Tm:   10/9/2020 8:35:49 AM CDT            Social History   Social History   (As Of: 12/31/2021 10:31:56 AM CST)   Alcohol:        Current, 1 TIME, 4 drinks/episode average.  5 drinks/episode maximum.   (Last Updated: 1/10/2019 3:16:46 PM CST by Leatha Means)          Tobacco:        Former smoker, quit more than 30 days ago   (Last Updated: 12/17/2020 5:39:32 PM CST by Jennifer Warren LPN)   Former smoker, quit more than 30 days ago   (Last Updated: 12/31/2021 10:31:13 AM CST by Moon Alves CMA)          Electronic Cigarette/Vaping:        Electronic Cigarette Use: vapes.  1/2 cartridge/day Use Per Day.   (Last Updated: 12/17/2020 5:39:22 PM CST by Jennifer Warren LPN)   Electronic Cigarette Use: Use, within last 90 days.   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   (Last Updated: 11/21/2016 11:14:11 AM CST by Kit Ordaz CMA)

## 2022-03-02 NOTE — NURSING NOTE
Seen for COVID testing at [facility] per  _ MARIA D    Fax Result to: _    O2 Sat = _96%  (Children under 12 do not require O2 sat)    Specimen sent to:  _ [facility]    PUI form faxed to _[facility].

## 2022-03-02 NOTE — PROGRESS NOTES
Patient:   MEERA CONTRERAS            MRN: [id number]            FIN: [id number]               Age:   26 years     Sex:  Female     :  1995   Associated Diagnoses:   Sore throat   Author:   Mars Alvarado MD      Visit Information      Date of Service: 2021 08:46 am  Performing Location: [facility]  Encounter#: [id number]      Primary Care Provider (PCP):  Miguelina Bush    NPI# [id number]      Referring Provider:  Mars Alvarado MD    NPI# [id number]   Visit type:  Video Visit via [employer] or [employer].    Participants in room during visit:  _ pt   Location of patient:  _ home  Location of provider:  _ (Clinic office )  Video Start Time:  _1040  Video End Time:   _1045    Today's visit was conducted via video conference due to the COVID-19 pandemic.  The patient's consent to proceed with a video visit has been obtained and documented.      Chief Complaint   2021 10:28 AM CST  Verbal consent given for video visit. ST, fever, HA, body aches since . At home test negative yesterday for COVID.        History of Present Illness   Patient  is being evaluated via a billable video visit.  chief complaint and symptoms as noted above confirmed with patient   temp 102      Review of Systems   Constitutional:  Negative except as documented in history of present illness.    Eye:  Negative.    Ear/Nose/Mouth/Throat:  Negative except as documented in history of present illness.    Respiratory:  Negative except as documented in history of present illness.    Cardiovascular:  Negative.    Gastrointestinal:  Negative.    Genitourinary:  Negative.    Immunologic:  Negative.    Musculoskeletal:  Negative.    Integumentary:  Negative.    Neurologic:  Negative.    Psychiatric:  Negative.       Health Status   Allergies:    Allergic Reactions (Selected)  Severity Not Documented  Clindamycin (Diarrhea)   Medications:  (Selected)   Prescriptions  Prescribed  Flonase 50 mcg/inh nasal  spray: = 2 spray(s), nasal, daily, # 16 gm, 3 Refill(s), Type: Maintenance, Pharmacy: [facility] #[id number], 2 spray(s) Nasal daily, 70.75, in, 03/04/21 11:32:00 CST, Height Measured, 299, lb, 03/04/21 11:32:00 CST, Weight Measured  nicotine 14 mg/24 hr transdermal film, extended release: 1 patch(es), Topical, daily, Instructions: start and use prior to 7mg dose, # 14 patch(es), 0 Refill(s), Type: Maintenance, Pharmacy: [facility] #[id number], 1 patch(es) Topical daily,x14 day(s),Instr:start and use prior to 7mg dose, 70.75, in, 1...  nicotine 7 mg/24 hr transdermal film, extended release: 1 patch(es), Topical, daily, Instructions: after 14 days of 14 mg nicotine, # 14 patch(es), 0 Refill(s), Type: Maintenance, Pharmacy: [facility] #[id number], 1 patch(es) Topical daily,x14 day(s),Instr:after 14 days of 14 mg nicotine, 70.75, in, 1...  Documented Medications  Documented  Allegra: po, 0 Refill(s), Type: Maintenance  Kyleena 19.5 mg intrauterine device: = 1 EA ( 19.5 mg ), Intrauteral, once, Instructions: inserted 2/22/19, 0 Refill(s), Type: Maintenance   Problem list:    All Problems  Obesity / SNOMED CT 5308731900 / Probable  Tobacco user / SNOMED CT 049184079 / Probable  Anxiety / SNOMED CT 5665575401 / Confirmed  Depressive disorder / SNOMED CT 6267339805 / Confirmed      Histories   Past Medical History:    No active or resolved past medical history items have been selected or recorded.   Family History:    No family history items have been selected or recorded.   Procedure history:    Encounter for insertion of intrauterine contraceptive device (ICD-10-CM Z30.430) performed by Miguelina Bush on 2/22/2019 at 23 Years.  Comments:  2/24/2019 8:02 AM CST - Jennifer Warren LPN  Kyleena IUD insertion  Lot# [id number]  Exp. 01/2021    Due for removal 2/22/24  Extraction of wisdom tooth (SNOMED CT 267333033).   Social History:        Electronic Cigarette/Vaping Assessment            Electronic  Cigarette Use: vapes.  1/2 cartridge/day Use Per Day.            Electronic Cigarette Use: Use, within last 90 days.      Alcohol Assessment            Current, 1 TIME, 4 drinks/episode average.  5 drinks/episode maximum.      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    Eye:  Pupils are equal, round and reactive to light, Normal conjunctiva.    HENT:  Oral mucosa is moist.    Neck:  Supple.    Respiratory:  Respirations are non-labored.    Psychiatric:  Cooperative, Appropriate mood & affect, Normal judgment.       Impression and Plan   Diagnosis     Sore throat (PLD13-VC J02.9).     Plan:  With acute viral syndrome most likely but will check for strep also Covid and influenza.  She will isolate at home in the meantime.  Call if worse.   .    Patient Instructions:       Counseled: Patient, Regarding diagnosis, Regarding treatment.

## 2022-03-02 NOTE — TELEPHONE ENCOUNTER
---------------------  From: Lavinia Acharya   To: Phone Messages Pool ([id number] - [place]);     Sent: 1/2/2022 11:33:45 AM CST  Subject: Covid Results      Left message for patient to call back for resultsCall to pt at 0822  LVM on personal phone of negative COVID and to call with any questions.

## 2022-03-02 NOTE — NURSING NOTE
Rapid Strep POC Entered On:  1/4/2022 3:23 PM CST    Performed On:  12/31/2021 3:23 PM CST by Jen Mcmahan               Rapid Strep POC   Rapid Strep POC :   Positive   POC Test Comments :   [facility]   Jen Mcmahan - 1/4/2022 3:23 PM CST

## 2022-03-02 NOTE — TELEPHONE ENCOUNTER
---------------------  From: Moon Alves CMA   Sent: 12/31/2021 4:26:44 PM CST  Subject: strep results     LM for pt. Positive strep. Per TFS amoxicillin 875mg BID x 10 days sent to [facility].

## 2022-10-03 ENCOUNTER — HEALTH MAINTENANCE LETTER (OUTPATIENT)
Age: 27
End: 2022-10-03

## 2023-02-12 ENCOUNTER — HEALTH MAINTENANCE LETTER (OUTPATIENT)
Age: 28
End: 2023-02-12

## 2023-02-22 ENCOUNTER — NURSE TRIAGE (OUTPATIENT)
Dept: NURSING | Facility: CLINIC | Age: 28
End: 2023-02-22
Payer: COMMERCIAL

## 2023-02-22 ENCOUNTER — OFFICE VISIT (OUTPATIENT)
Dept: FAMILY MEDICINE | Facility: CLINIC | Age: 28
End: 2023-02-22
Payer: COMMERCIAL

## 2023-02-22 VITALS
BODY MASS INDEX: 40.88 KG/M2 | SYSTOLIC BLOOD PRESSURE: 128 MMHG | HEIGHT: 71 IN | HEART RATE: 96 BPM | TEMPERATURE: 97.9 F | DIASTOLIC BLOOD PRESSURE: 92 MMHG | WEIGHT: 292 LBS

## 2023-02-22 DIAGNOSIS — E66.01 MORBID OBESITY (H): ICD-10-CM

## 2023-02-22 DIAGNOSIS — J10.1 INFLUENZA A: Primary | ICD-10-CM

## 2023-02-22 PROCEDURE — 99213 OFFICE O/P EST LOW 20 MIN: CPT | Performed by: FAMILY MEDICINE

## 2023-02-22 RX ORDER — OSELTAMIVIR PHOSPHATE 75 MG/1
75 CAPSULE ORAL 2 TIMES DAILY
Qty: 10 CAPSULE | Refills: 0 | Status: SHIPPED | OUTPATIENT
Start: 2023-02-22 | End: 2023-02-27

## 2023-02-22 RX ORDER — METFORMIN HCL 500 MG
2 TABLET, EXTENDED RELEASE 24 HR ORAL 2 TIMES DAILY
COMMUNITY
Start: 2023-02-13

## 2023-02-22 RX ORDER — DESVENLAFAXINE 25 MG/1
TABLET, EXTENDED RELEASE ORAL
COMMUNITY
Start: 2023-02-13

## 2023-02-22 RX ORDER — DESVENLAFAXINE 50 MG/1
1 TABLET, FILM COATED, EXTENDED RELEASE ORAL
COMMUNITY
Start: 2022-12-20

## 2023-02-22 RX ORDER — LISDEXAMFETAMINE DIMESYLATE 30 MG/1
1 CAPSULE ORAL DAILY PRN
COMMUNITY
Start: 2023-02-13

## 2023-02-22 NOTE — PROGRESS NOTES
"  Assessment & Plan     Influenza A  Patient with close exposure to influenza yesterday now with symptoms today.  We will treat with Tamiflu.  She does have some chronic sinus issues that she will track but no further treatment for now  - oseltamivir (TAMIFLU) 75 MG capsule; Take 1 capsule (75 mg) by mouth 2 times daily for 5 days    Morbid obesity (H)                 BMI:   Estimated body mass index is 41.01 kg/m  as calculated from the following:    Height as of this encounter: 1.797 m (5' 10.75\").    Weight as of this encounter: 132.5 kg (292 lb).           No follow-ups on file.    Mars Alvarado MD  [facility]    Kai Gomez is a 27 year old, presenting for the following health issues: Patient is notes that her boyfriend was diagnosed with influenza yesterday he is on Tamiflu.  She is noting that her throat feels scratchy and she is congested.  She has had some sinus issues on and off for over a month but this is new today.  No fever chills or sweats no body aches  Nasal Congestion and Sinus Problem      History of Present Illness       Reason for visit:  Cold?  Symptom onset:  1-3 days ago    She eats 0-1 servings of fruits and vegetables daily.She consumes 0 sweetened beverage(s) daily.She exercises with enough effort to increase her heart rate 10 to 19 minutes per day.  She is missing 1 dose(s) of medications per week.     Sinus congestion, drainage, fatigue, dizziness. Mono dx last month.     Recent tx for earring infection which helped sinus sx - finished cephalexin on Thursday.      Exposure to influenza yesterday.           Review of Systems   Constitutional, HEENT, cardiovascular, pulmonary, gi and gu systems are negative, except as otherwise noted.      Objective    BP (!) 128/92 (BP Location: Right arm, Patient Position: Sitting, Cuff Size: Adult Large)   Pulse 96   Temp 97.9  F (36.6  C) (Temporal)   Ht 1.797 m (5' 10.75\")   Wt 132.5 kg (292 lb)   BMI 41.01 " kg/m    Body mass index is 41.01 kg/m .  Physical Exam   GENERAL: healthy, alert and no distress  EYES: Eyes grossly normal to inspection, PERRL and conjunctivae and sclerae normal  HENT: ear canals and TM's normal, nose and mouth without ulcers or lesions  NECK: no adenopathy, no asymmetry, masses, or scars and thyroid normal to palpation  RESP: lungs clear to auscultation - no rales, rhonchi or wheezes  CV: regular rate and rhythm, normal S1 S2, no S3 or S4, no murmur, click or rub, no peripheral edema and peripheral pulses strong  MS: no gross musculoskeletal defects noted, no edema

## 2023-02-22 NOTE — TELEPHONE ENCOUNTER
Talked to . Thinks she should come in person. Partner has influenza. She shows symptoms. Negative for coronavirus. A couple weeks ago had similar symptoms. Fatigue, scratchy throat, tonsils hurt. Biggest, more annoying, coughing up thick phlegm and it smells like an infection. Denies fever.  I connected with scheduling for an appointment today or tomorrow and advised urgent care if they can't get her in.  Brittany Chambers RN  [facility]        Reason for Disposition    Patient wants to be seen    Additional Information    Negative: Bluish (or gray) lips or face    Negative: SEVERE difficulty breathing (e.g., struggling for each breath, speaks in single words)    Negative: Rapid onset of cough and has hives    Negative: Coughing started suddenly after medicine, an allergic food or bee sting    Negative: Difficulty breathing after exposure to flames, smoke, or fumes    Negative: Sounds like a life-threatening emergency to the triager    Negative: Previous asthma attacks and this feels like asthma attack    Negative: Dry cough (non-productive; no sputum or minimal clear sputum) and within 14 days of COVID-19 Exposure    Negative: MODERATE difficulty breathing (e.g., speaks in phrases, SOB even at rest, pulse 100-120) and still present when not coughing    Negative: Chest pain present when not coughing    Negative: Passed out (i.e., fainted, collapsed and was not responding)    Negative: Patient sounds very sick or weak to the triager    Negative: MILD difficulty breathing (e.g., minimal/no SOB at rest, SOB with walking, pulse <100) and still present when not coughing    Negative: Coughed up > 1 tablespoon (15 ml) blood (Exception: Blood-tinged sputum.)    Negative: Fever > 103 F (39.4 C)    Negative: Fever > 101 F (38.3 C) and over 60 years of age    Negative: Fever > 100.0 F (37.8 C) and has diabetes mellitus or a weak immune system (e.g., HIV positive, cancer chemotherapy, organ transplant,  splenectomy, chronic steroids)    Negative: Fever > 100.0 F (37.8 C) and bedridden (e.g., nursing home patient, stroke, chronic illness, recovering from surgery)    Negative: Increasing ankle swelling    Negative: Wheezing is present    Negative: SEVERE coughing spells (e.g., whooping sound after coughing, vomiting after coughing)    Negative: Coughing up jeanine-colored (reddish-brown) or blood-tinged sputum    Negative: Fever present > 3 days (72 hours)    Negative: Fever returns after gone for over 24 hours and symptoms worse or not improved    Negative: Using nasal washes and pain medicine > 24 hours and sinus pain persists    Negative: Known COPD or other severe lung disease (i.e., bronchiectasis, cystic fibrosis, lung surgery) and worsening symptoms (i.e., increased sputum purulence or amount, increased breathing difficulty)    Negative: Continuous (nonstop) coughing interferes with work or school and no improvement using cough treatment per Care Advice    Protocols used: COUGH-A-OH

## 2024-03-10 ENCOUNTER — HEALTH MAINTENANCE LETTER (OUTPATIENT)
Age: 29
End: 2024-03-10

## 2025-03-16 ENCOUNTER — HEALTH MAINTENANCE LETTER (OUTPATIENT)
Age: 30
End: 2025-03-16